# Patient Record
Sex: MALE | Race: BLACK OR AFRICAN AMERICAN | NOT HISPANIC OR LATINO | Employment: FULL TIME | URBAN - METROPOLITAN AREA
[De-identification: names, ages, dates, MRNs, and addresses within clinical notes are randomized per-mention and may not be internally consistent; named-entity substitution may affect disease eponyms.]

---

## 2017-11-01 ENCOUNTER — OFFICE VISIT (OUTPATIENT)
Dept: FAMILY MEDICINE | Facility: CLINIC | Age: 57
End: 2017-11-01
Payer: COMMERCIAL

## 2017-11-01 VITALS
DIASTOLIC BLOOD PRESSURE: 86 MMHG | WEIGHT: 211.63 LBS | TEMPERATURE: 99 F | OXYGEN SATURATION: 97 % | HEIGHT: 66 IN | BODY MASS INDEX: 34.01 KG/M2 | SYSTOLIC BLOOD PRESSURE: 110 MMHG | HEART RATE: 73 BPM

## 2017-11-01 DIAGNOSIS — E78.5 HYPERLIPIDEMIA, UNSPECIFIED HYPERLIPIDEMIA TYPE: ICD-10-CM

## 2017-11-01 DIAGNOSIS — M54.50 ACUTE LEFT-SIDED LOW BACK PAIN WITHOUT SCIATICA: ICD-10-CM

## 2017-11-01 DIAGNOSIS — I10 ESSENTIAL HYPERTENSION: Primary | ICD-10-CM

## 2017-11-01 DIAGNOSIS — E03.8 OTHER SPECIFIED HYPOTHYROIDISM: ICD-10-CM

## 2017-11-01 DIAGNOSIS — R73.03 PREDIABETES: ICD-10-CM

## 2017-11-01 DIAGNOSIS — J30.89 CHRONIC NONSEASONAL ALLERGIC RHINITIS DUE TO OTHER ALLERGEN: ICD-10-CM

## 2017-11-01 PROCEDURE — 99204 OFFICE O/P NEW MOD 45 MIN: CPT | Mod: S$GLB,,, | Performed by: FAMILY MEDICINE

## 2017-11-01 PROCEDURE — 99999 PR PBB SHADOW E&M-EST. PATIENT-LVL III: CPT | Mod: PBBFAC,,, | Performed by: FAMILY MEDICINE

## 2017-11-01 RX ORDER — LEVOTHYROXINE SODIUM 25 UG/1
25 TABLET ORAL DAILY
COMMUNITY
End: 2017-11-01 | Stop reason: SDUPTHER

## 2017-11-01 RX ORDER — LEVOTHYROXINE SODIUM 25 UG/1
25 TABLET ORAL DAILY
Qty: 90 TABLET | Refills: 1 | Status: SHIPPED | OUTPATIENT
Start: 2017-11-01 | End: 2019-01-22 | Stop reason: SDUPTHER

## 2017-11-01 RX ORDER — CYCLOBENZAPRINE HCL 10 MG
10 TABLET ORAL NIGHTLY
Qty: 21 TABLET | Refills: 0 | Status: SHIPPED | OUTPATIENT
Start: 2017-11-01 | End: 2019-01-22

## 2017-11-01 RX ORDER — AMLODIPINE BESYLATE 5 MG/1
5 TABLET ORAL DAILY
Qty: 90 TABLET | Refills: 1 | Status: SHIPPED | OUTPATIENT
Start: 2017-11-01 | End: 2019-01-10 | Stop reason: SDUPTHER

## 2017-11-01 RX ORDER — LOVASTATIN 20 MG/1
10 TABLET ORAL NIGHTLY
COMMUNITY
End: 2017-11-01 | Stop reason: SDUPTHER

## 2017-11-01 RX ORDER — LOVASTATIN 20 MG/1
10 TABLET ORAL NIGHTLY
Qty: 90 TABLET | Refills: 1 | Status: SHIPPED | OUTPATIENT
Start: 2017-11-01 | End: 2019-01-22

## 2017-11-01 RX ORDER — NAPROXEN 500 MG/1
500 TABLET ORAL 2 TIMES DAILY WITH MEALS
Qty: 30 TABLET | Refills: 0 | Status: SHIPPED | OUTPATIENT
Start: 2017-11-01 | End: 2019-01-22

## 2017-11-01 NOTE — PROGRESS NOTES
Office Visit    Patient Name: Vijay Rodriguez    : 1960  MRN: [id number]      Assessment/Plan:  Vijay Rodriguez is a 57 y.o. male who presents today for :    Essential hypertension  -     amLODIPine (NORVASC) 5 MG tablet; Take 1 tablet (5 mg total) by mouth once daily.  Dispense: 90 tablet; Refill: 1  -BP acceptable for age - continue current med regimen  -previous labs reviewed  -advised DASH diet, regular exercise    Prediabetes  -recheck A1c    Hyperlipidemia, unspecified hyperlipidemia type  -     lovastatin (MEVACOR) 20 MG tablet; Take 0.5 tablets (10 mg total) by mouth every evening.  Dispense: 90 tablet; Refill: 1    Other specified hypothyroidism  -     levothyroxine (SYNTHROID) 25 MCG tablet; Take 1 tablet (25 mcg total) by mouth once daily.  Dispense: 90 tablet; Refill: 1  -recheck TSH, cont on current med    Acute left-sided low back pain without sciatica  -     cyclobenzaprine (FLEXERIL) 10 MG tablet; Take 1 tablet (10 mg total) by mouth every evening.  Dispense: 21 tablet; Refill: 0  -     naproxen (NAPROSYN) 500 MG tablet; Take 1 tablet (500 mg total) by mouth 2 (two) times daily with meals.  Dispense: 30 tablet; Refill: 0  -exam c/w mm spasm with no new Sx or red flags.  -reassurance provided - advised pt that pain may last up to 4-6 weeks, but in the meantime, advised to add heat/massage and avoid provocative movements that could worsen pain, maintain good posture, as well as using proper lifting techniques.  -initiate Naproxen and Flexeril, advised adding Tylenol in between doses of NSAIDs as needed for pain. Side effects and precautions given.   -counseled patient extensively on stretching/strengthening exercises, maintaining good posture as demonstrated in clinic (handout also given) to help with decreasing risk for future injury.  -RTC in 4-6 weeks, or sooner if Sx worsens or call clinic back if pt has any concerns.        Chronic nonseasonal allergic rhinitis due to other  allergen  -     Ambulatory referral to Allergy      No Follow-up on file.     This note was created by combination of typed  and Dragon dictation.  Transcription errors may be present.  If there are any questions, please contact me.                  ----------------------------------------------------------------------------------------------------------------------      HPI:  Vijay KAN Jr is a 57 y.o. male who presents today for:    Hypertension and Back Pain        This patient has multiple medical diagnoses as noted below.    This patient is new to me     He is here to establish care and get refills on his medications    HTN  refills needed today  pt is compliant with daily HTN medications without any side effects  current medication: amlodipine 5mg  BP log at home? No logs  Pt denies vision changes/urinary changes/leg swelling  Pt tries restricting salt-intake, doing regular exercises.    HLD  tolerating statin well with no side effects.  Current dose: lovastatin 10mg  No mm aches or pain    Pre-diabetes   Patient states he was told he had prediabetes and was put on this medication for a few months with previous PCP and was taken off of it - has been off of it for several years.      Denies polyuria/polydipsia/foot numbness/tingling/vision changes/hypoglycemia  Denies dietary compliance/wt loss/exercise    Hypothyroidism    Tolerating medication well, no side effects.   Currently taking daily Levothyroxine 25mcg  DENIES fatigue/generalized arthralgias or myalgia/cold intolerance/tremors/wt gain/dry skin/hair loss/brittle nails      BACK PAIN    here for back pain for 3 weeks, in the left side  No inciting event  Worse with bending over, twisting trunk, going from sitting to standing position  Better with OTC meds, but pain returns  Pain level: 7/10  Pt is still able to ambulate comfortably and perform daily routine    No wt loss/fatigue/malaise/BMs changes or urinary changes/pain radiation to  "LEs/tingling/numbness/weakness.      Has a history of severe allergic rhinitis - constant sneezing/coughing and congestion.  Would like new referral to Allergist.      Additional ROS    No F/C/wt changes/fatigue  No dysphagia/sore throat  No CP/SOB/palpitations/swelling  No cough/wheezing  No nausea/vomiting/abd pain/blood in stool, no diarrhea, no constipation  No rashes  No weakness/HA/tingling/numbness  No anxiety/depression  No dysuria/hematuria      Patient Active Problem List   Diagnosis    Elbow pain    Hypertension    Diabetes    Allergic rhinitis    Stress    Arm numbness left       Past Surgical History:   Procedure Laterality Date    PROSTATE SURGERY  2007       Family History   Problem Relation Age of Onset    Cancer Mother 60     colon    Hypertension Mother     Cancer Father 60     prostate    Diabetes Father     Hypertension Father     Hypertension Brother     Cancer Brother 52     pancreatic       Social History     Social History    Marital status:      Spouse name: N/A    Number of children: N/A    Years of education: N/A     Occupational History     [employer]     Social History Main Topics    Smoking status: Current Every Day Smoker     Packs/day: 0.25     Years: 30.00    Smokeless tobacco: Not on file    Alcohol use Yes      Comment: 6 pk beer per week    Drug use: No    Sexual activity: Not on file     Other Topics Concern    Not on file     Social History Narrative    No narrative on file       Current Medications  Medications reviewed and updated.     Allergies   Review of patient's allergies indicates:  No Known Allergies          Review of Systems  See HPI      Physical Exam  /86   Pulse 73   Temp 98.6 °F (37 °C) (Oral)   Ht 5' 6" (1.676 m)   Wt 96 kg (211 lb 10.3 oz)   SpO2 97%   BMI 34.16 kg/m²     GEN: NAD, well developed, pleasant, well nourished  HEENT: NCAT, PERRLA, EOMI, sclera clear, anicteric, O/P clear, MMM with no lesions  NECK: normal, " supple with midline trachea, no LAD, no thyromegaly  LUNGS: CTAB, no w/r/r, no increased work of breathing   HEART: RRR, normal S1 and S2, no m/r/g, no edema  ABD: s/nt/nd, NABS  SKIN: normal turgor, no rashes  PSYCH: AOx3, appropriate mood and affect  MSK: warm/well perfused, normal ROM in all 4 extremities, no c/c/e.  BACK: normal alignment of spine. FROM of back.   +TTP over the L4 area over L paraspinal mm.   +pain with forward flexion and backward extension. Mild pain with lateral bending. NEG straight leg raise.

## 2017-11-04 ENCOUNTER — TELEPHONE (OUTPATIENT)
Dept: FAMILY MEDICINE | Facility: CLINIC | Age: 57
End: 2017-11-04

## 2017-11-04 RX ORDER — LOVASTATIN 10 MG/1
10 TABLET ORAL NIGHTLY
COMMUNITY
End: 2019-01-22

## 2017-11-04 NOTE — TELEPHONE ENCOUNTER
----- Message from Otilia Wise sent at 11/3/2017  3:25 PM CDT -----  Contact: Claus Alfaro [phone]  Pharmacy wants to know if the pt medication can be changed to lovastatin (MEVACOR) 20 MG tablet to lovastatin (MEVACOR) 10 MG tablet so the pt can take one by mouth every day Please call  at your earliest convenience.  Thanks!

## 2017-11-13 ENCOUNTER — LAB VISIT (OUTPATIENT)
Dept: LAB | Facility: HOSPITAL | Age: 57
End: 2017-11-13
Attending: FAMILY MEDICINE
Payer: COMMERCIAL

## 2017-11-13 DIAGNOSIS — E78.5 HYPERLIPIDEMIA, UNSPECIFIED HYPERLIPIDEMIA TYPE: ICD-10-CM

## 2017-11-13 DIAGNOSIS — I10 ESSENTIAL HYPERTENSION: ICD-10-CM

## 2017-11-13 DIAGNOSIS — E03.8 OTHER SPECIFIED HYPOTHYROIDISM: ICD-10-CM

## 2017-11-13 DIAGNOSIS — R73.03 PREDIABETES: ICD-10-CM

## 2017-11-13 LAB
ALBUMIN SERPL BCP-MCNC: 3.7 G/DL
ALP SERPL-CCNC: 67 U/L
ALT SERPL W/O P-5'-P-CCNC: 22 U/L
ANION GAP SERPL CALC-SCNC: 7 MMOL/L
AST SERPL-CCNC: 23 U/L
BILIRUB SERPL-MCNC: 0.3 MG/DL
BUN SERPL-MCNC: 9 MG/DL
CALCIUM SERPL-MCNC: 9.1 MG/DL
CHLORIDE SERPL-SCNC: 108 MMOL/L
CHOLEST SERPL-MCNC: 147 MG/DL
CHOLEST/HDLC SERPL: 4.6 {RATIO}
CO2 SERPL-SCNC: 25 MMOL/L
CREAT SERPL-MCNC: 1.2 MG/DL
ERYTHROCYTE [DISTWIDTH] IN BLOOD BY AUTOMATED COUNT: 13 %
EST. GFR  (AFRICAN AMERICAN): >60 ML/MIN/1.73 M^2
EST. GFR  (NON AFRICAN AMERICAN): >60 ML/MIN/1.73 M^2
ESTIMATED AVG GLUCOSE: 123 MG/DL
GLUCOSE SERPL-MCNC: 99 MG/DL
HBA1C MFR BLD HPLC: 5.9 %
HCT VFR BLD AUTO: 40.9 %
HDLC SERPL-MCNC: 32 MG/DL
HDLC SERPL: 21.8 %
HGB BLD-MCNC: 13.6 G/DL
LDLC SERPL CALC-MCNC: 77.2 MG/DL
MCH RBC QN AUTO: 30.2 PG
MCHC RBC AUTO-ENTMCNC: 33.3 G/DL
MCV RBC AUTO: 91 FL
NONHDLC SERPL-MCNC: 115 MG/DL
PLATELET # BLD AUTO: 319 K/UL
PMV BLD AUTO: 8.9 FL
POTASSIUM SERPL-SCNC: 4.1 MMOL/L
PROT SERPL-MCNC: 7.7 G/DL
RBC # BLD AUTO: 4.5 M/UL
SODIUM SERPL-SCNC: 140 MMOL/L
T4 FREE SERPL-MCNC: 0.82 NG/DL
TRIGL SERPL-MCNC: 189 MG/DL
TSH SERPL DL<=0.005 MIU/L-ACNC: 7.74 UIU/ML
WBC # BLD AUTO: 9.64 K/UL

## 2017-11-13 PROCEDURE — 85027 COMPLETE CBC AUTOMATED: CPT

## 2017-11-13 PROCEDURE — 36415 COLL VENOUS BLD VENIPUNCTURE: CPT | Mod: PO

## 2017-11-13 PROCEDURE — 84443 ASSAY THYROID STIM HORMONE: CPT

## 2017-11-13 PROCEDURE — 80053 COMPREHEN METABOLIC PANEL: CPT

## 2017-11-13 PROCEDURE — 83036 HEMOGLOBIN GLYCOSYLATED A1C: CPT

## 2017-11-13 PROCEDURE — 84439 ASSAY OF FREE THYROXINE: CPT

## 2017-11-13 PROCEDURE — 80061 LIPID PANEL: CPT

## 2017-11-14 ENCOUNTER — TELEPHONE (OUTPATIENT)
Dept: FAMILY MEDICINE | Facility: CLINIC | Age: 57
End: 2017-11-14

## 2017-11-14 DIAGNOSIS — E03.8 OTHER SPECIFIED HYPOTHYROIDISM: ICD-10-CM

## 2017-11-14 PROBLEM — E03.9 ACQUIRED HYPOTHYROIDISM: Status: ACTIVE | Noted: 2017-11-14

## 2017-11-14 NOTE — TELEPHONE ENCOUNTER
Note to staff: Please call patient to inform of the following lab results:    --> Based on your bloodwork,your test results show:      --that you also have very mild anemia, which I recommend eating iron rich-diet, such as lean red meat/fish/chicken and vegetables such spinach/soybeans/lentils/beans.    --NORMAL liver test  --NORMAL kidney test   --NO diabetes   -But A1c is still  in Pre-diabetic range like last year, but it's improved this year. I recommend lowering carbohydrates in diet (pasta, rice, bread, potatoes, crackers, cookies, candy, soda).   -you do not need to be on medications for this at this time and I recommend to continue with healthy diet and regular exercise as discussed at previous visit.  --CHOLESTEROL tests are normal except for Triglycerides, which went up, continue taking your cholesterol medication at this time. You may add Omega-3 Fatty acid supplements to your daily regimen.    -Your thyroid level T4 is normal, so continue taking your thyroid medication.  I would like for you to come back and see me after [place] to follow up on your thyroid levels to see if we need to make any medication adjustment.  Please get labs 3 days before your appointment so we can review them at that appointment.      If you would like to discuss results in detail, to please make a follow up appointment with me.      Thank you,    Isidro Prado MD

## 2017-11-16 ENCOUNTER — TELEPHONE (OUTPATIENT)
Dept: FAMILY MEDICINE | Facility: CLINIC | Age: 57
End: 2017-11-16

## 2017-11-16 NOTE — TELEPHONE ENCOUNTER
----- Message from Frankie River sent at 11/16/2017 11:12 AM CST -----  Contact: Self  Returned [phone].

## 2017-11-16 NOTE — TELEPHONE ENCOUNTER
----- Message from Frankie River sent at 11/15/2017  1:18 PM CST -----  Contact: Self  Pt returning call from yesterday. Pt can be reached @ [phone].

## 2018-10-31 ENCOUNTER — TELEPHONE (OUTPATIENT)
Dept: FAMILY MEDICINE | Facility: CLINIC | Age: 58
End: 2018-10-31

## 2018-10-31 NOTE — TELEPHONE ENCOUNTER
----- Message from Naz Lawler sent at 10/31/2018  9:46 AM CDT -----  Contact: Self   Pt returning call. [phone]

## 2018-10-31 NOTE — TELEPHONE ENCOUNTER
----- Message from Maricruz Garcia sent at 10/31/2018  8:46 AM CDT -----  Contact: Self/[phone]  Patient called to request an ENT appointment. Thank you.

## 2018-11-02 DIAGNOSIS — Z11.59 NEED FOR HEPATITIS C SCREENING TEST: ICD-10-CM

## 2019-01-10 DIAGNOSIS — I10 ESSENTIAL HYPERTENSION: ICD-10-CM

## 2019-01-11 RX ORDER — AMLODIPINE BESYLATE 5 MG/1
5 TABLET ORAL DAILY
Qty: 14 TABLET | Refills: 0 | Status: SHIPPED | OUTPATIENT
Start: 2019-01-11 | End: 2019-01-22 | Stop reason: SDUPTHER

## 2019-01-22 ENCOUNTER — OFFICE VISIT (OUTPATIENT)
Dept: FAMILY MEDICINE | Facility: CLINIC | Age: 59
End: 2019-01-22
Payer: COMMERCIAL

## 2019-01-22 VITALS
HEART RATE: 69 BPM | OXYGEN SATURATION: 98 % | WEIGHT: 220.56 LBS | HEIGHT: 67 IN | TEMPERATURE: 99 F | SYSTOLIC BLOOD PRESSURE: 136 MMHG | DIASTOLIC BLOOD PRESSURE: 78 MMHG | BODY MASS INDEX: 34.62 KG/M2

## 2019-01-22 DIAGNOSIS — E78.5 HYPERLIPIDEMIA, UNSPECIFIED HYPERLIPIDEMIA TYPE: ICD-10-CM

## 2019-01-22 DIAGNOSIS — I10 ESSENTIAL HYPERTENSION: ICD-10-CM

## 2019-01-22 DIAGNOSIS — Z00.00 ANNUAL PHYSICAL EXAM: Primary | ICD-10-CM

## 2019-01-22 DIAGNOSIS — F17.200 TOBACCO DEPENDENCE: ICD-10-CM

## 2019-01-22 DIAGNOSIS — Z85.46 HISTORY OF PROSTATE CANCER: ICD-10-CM

## 2019-01-22 DIAGNOSIS — E03.8 OTHER SPECIFIED HYPOTHYROIDISM: ICD-10-CM

## 2019-01-22 DIAGNOSIS — R73.03 PREDIABETES: ICD-10-CM

## 2019-01-22 DIAGNOSIS — E66.9 OBESITY (BMI 30-39.9): ICD-10-CM

## 2019-01-22 PROCEDURE — 3078F PR MOST RECENT DIASTOLIC BLOOD PRESSURE < 80 MM HG: ICD-10-PCS | Mod: CPTII,S$GLB,, | Performed by: FAMILY MEDICINE

## 2019-01-22 PROCEDURE — 3075F PR MOST RECENT SYSTOLIC BLOOD PRESS GE 130-139MM HG: ICD-10-PCS | Mod: CPTII,S$GLB,, | Performed by: FAMILY MEDICINE

## 2019-01-22 PROCEDURE — 99396 PREV VISIT EST AGE 40-64: CPT | Mod: S$GLB,,, | Performed by: FAMILY MEDICINE

## 2019-01-22 PROCEDURE — 99999 PR PBB SHADOW E&M-EST. PATIENT-LVL III: ICD-10-PCS | Mod: PBBFAC,,, | Performed by: FAMILY MEDICINE

## 2019-01-22 PROCEDURE — 3075F SYST BP GE 130 - 139MM HG: CPT | Mod: CPTII,S$GLB,, | Performed by: FAMILY MEDICINE

## 2019-01-22 PROCEDURE — 99396 PR PREVENTIVE VISIT,EST,40-64: ICD-10-PCS | Mod: S$GLB,,, | Performed by: FAMILY MEDICINE

## 2019-01-22 PROCEDURE — 99999 PR PBB SHADOW E&M-EST. PATIENT-LVL III: CPT | Mod: PBBFAC,,, | Performed by: FAMILY MEDICINE

## 2019-01-22 PROCEDURE — 3078F DIAST BP <80 MM HG: CPT | Mod: CPTII,S$GLB,, | Performed by: FAMILY MEDICINE

## 2019-01-22 RX ORDER — ERGOCALCIFEROL 1.25 MG/1
CAPSULE ORAL
COMMUNITY
Start: 2018-12-12 | End: 2022-03-17 | Stop reason: SDUPTHER

## 2019-01-22 RX ORDER — AMLODIPINE BESYLATE 5 MG/1
5 TABLET ORAL DAILY
Qty: 90 TABLET | Refills: 1 | Status: SHIPPED | OUTPATIENT
Start: 2019-01-22 | End: 2019-01-31

## 2019-01-22 RX ORDER — LEVOTHYROXINE SODIUM 25 UG/1
25 TABLET ORAL DAILY
Qty: 90 TABLET | Refills: 1 | Status: SHIPPED | OUTPATIENT
Start: 2019-01-22 | End: 2020-07-21

## 2019-01-22 RX ORDER — ATORVASTATIN CALCIUM 20 MG/1
20 TABLET, FILM COATED ORAL DAILY
Qty: 90 TABLET | Refills: 3 | Status: SHIPPED | OUTPATIENT
Start: 2019-01-22 | End: 2020-05-21 | Stop reason: SDUPTHER

## 2019-01-22 RX ORDER — EPINEPHRINE 0.3 MG/.3ML
INJECTION SUBCUTANEOUS
Refills: 1 | COMMUNITY
Start: 2018-12-12 | End: 2023-01-26 | Stop reason: SDUPTHER

## 2019-01-22 RX ORDER — AMLODIPINE BESYLATE 5 MG/1
5 TABLET ORAL DAILY
Qty: 14 TABLET | Refills: 0 | Status: CANCELLED | OUTPATIENT
Start: 2019-01-22

## 2019-01-22 NOTE — PROGRESS NOTES
Office Visit    Patient Name: Vijay Rodriguez    : 1960  MRN: [id number]      Assessment/Plan:  Vijay Rodriguez is a 58 y.o. male who presents today for :    Annual physical exam  -     Hepatitis C antibody; Future; Expected date: 2019  -anticipatory guidance provided with age appropriate preventative services discussed, healthy diet and regular physical exercise also discussed with patient  -d/w pt about the importance of portion control  -Diet and exercise planning discussed.  -Recommend 15-30 minutes of moderate intensity exercise 5 days/week.      Essential hypertension  -     CBC Without Differential; Future; Expected date: 2019  -     Comprehensive metabolic panel; Future; Expected date: 2019  Prediabetes  -     Hemoglobin A1c; Future; Expected date: 2019  Obesity (BMI 30-39.9)  Tobacco dependence  - continue current medications   - ?advised DASH diet, portion control, regular cardiovascular exercises  - ?counseled on weight loss  - ?counseled on smoking cessation    Other specified hypothyroidism  -     TSH; Future; Expected date: 2019  -recheck TSH  -restart Levothyroxine 25mcg    Hyperlipidemia, unspecified hyperlipidemia type  -     Lipid panel; Future; Expected date: 2019  -continue statin    History of prostate cancer  -     Ambulatory referral to Urology        Follow-up in about 6 months (around 2019).     This note was created by combination of typed  and Dragon dictation.  Transcription errors may be present.  If there are any questions, please contact me.        ----------------------------------------------------------------------------------------------------------------------      HPI:  Patient Care Team:  Isidro Prado MD as PCP - General (Family Medicine)    Vijay KAN Jr is a 58 y.o. male with      Patient Active Problem List   Diagnosis    Elbow pain    Hypertension    Allergic rhinitis    Stress    Arm numbness left     Other specified hypothyroidism    Obesity (BMI 30-39.9)    Hyperlipidemia     This patient is known to me and to this clinic. The patient's last visit with me was on 11/1/2017.  Patient presents today for:  Annual Exam      Patient is doing well and has no major complaints.  Pt is trying to eat a healthier diet, with small portions and less salt.   Pt does not engage in physical exercises regularly. He has not been taking his cholesterol nor thyroid medication, but has been compliant with Norvasc daily. No BP logs at home. He desires to switch Urology care to [facility], [place] [facility] [place] has been going there since, denies any urinary issues today.     Additional ROS  No F/C/wt changes/fatigue  No dysphagia/sore throat/rhinorrhea  No CP/GLEASON/palpitations/swelling  No cough/wheezing/SOB  No nausea/vomiting/abd pain/no diarrhea, no constipation, no blood in stool  No muscle aches/joint pain   No rashes  No weakness/HA/tingling/numbness  No anxiety/depression  No dysuria/hematuria  No polyuria/polydipsia/fatigue/cold or hot intolerance          Current Medications  Medications reviewed and updated.       Current Outpatient Medications:     amLODIPine (NORVASC) 5 MG tablet, Take 1 tablet (5 mg total) by mouth once daily. Short-term refill - please make a follow up visit with your doctor for more refills., Disp: 90 tablet, Rfl: 1    EPINEPHrine (EPIPEN) 0.3 mg/0.3 mL AtIn, TAKE 1 AUTO(S) AS NEEDED BY INJECTION ROUTE., Disp: , Rfl: 1    levothyroxine (SYNTHROID) 25 MCG tablet, Take 1 tablet (25 mcg total) by mouth once daily., Disp: 90 tablet, Rfl: 1    ranitidine (ZANTAC) 300 MG tablet, Take 1 tablet by mouth., Disp: , Rfl:     VITAMIN D2 50,000 unit capsule, , Disp: , Rfl:     atorvastatin (LIPITOR) 20 MG tablet, Take 1 tablet (20 mg total) by mouth once daily., Disp: 90 tablet, Rfl: 3    Past Surgical History:   Procedure Laterality Date    PROSTATE SURGERY  2007  "      Family History   Problem Relation Age of Onset    Cancer Mother 60        colon    Hypertension Mother     Cancer Father 60        prostate    Diabetes Father     Hypertension Father     Hypertension Brother     Cancer Brother 52        pancreatic       Social History     Socioeconomic History    Marital status:      Spouse name: Not on file    Number of children: Not on file    Years of education: Not on file    Highest education level: Not on file   Social Needs    Financial resource strain: Not on file    Food insecurity - worry: Not on file    Food insecurity - inability: Not on file    Transportation needs - medical: Not on file    Transportation needs - non-medical: Not on file   Occupational History     Employer: [employer]   Tobacco Use    Smoking status: Current Every Day Smoker     Packs/day: 0.25     Years: 30.00     Pack years: 7.50   Substance and Sexual Activity    Alcohol use: Yes     Comment: 6 pk beer per week    Drug use: No    Sexual activity: Not on file   Other Topics Concern    Not on file   Social History Narrative    Not on file           Allergies   Review of patient's allergies indicates:  No Known Allergies          Review of Systems  See HPI      Physical Exam  /78   Pulse 69   Temp 98.5 °F (36.9 °C) (Oral)   Ht 5' 7" (1.702 m)   Wt 100 kg (220 lb 9.1 oz)   SpO2 98%   BMI 34.55 kg/m²     GEN: NAD, well developed, pleasant, well nourished  HEENT: NCAT, PERRLA, EOMI, sclera clear, anicteric, bilateral ear exam wnl, O/P clear, MMM with no lesions  NECK: normal, supple with midline trachea, no LAD, no thyromegaly  LUNGS: CTAB, no w/r/r, no increased work of breathing   HEART: RRR, normal S1 and S2, no m/r/g, no edema  ABD: s/nt/nd, NABS  SKIN: normal turgor, no rashes  PSYCH: AOx3, appropriate mood and affect  MSK: warm/well perfused, normal ROM in all extremities, no c/c/e.      "

## 2019-01-31 ENCOUNTER — OFFICE VISIT (OUTPATIENT)
Dept: FAMILY MEDICINE | Facility: CLINIC | Age: 59
End: 2019-01-31
Payer: COMMERCIAL

## 2019-01-31 ENCOUNTER — TELEPHONE (OUTPATIENT)
Dept: FAMILY MEDICINE | Facility: CLINIC | Age: 59
End: 2019-01-31

## 2019-01-31 VITALS
WEIGHT: 220 LBS | HEIGHT: 67 IN | OXYGEN SATURATION: 96 % | SYSTOLIC BLOOD PRESSURE: 140 MMHG | DIASTOLIC BLOOD PRESSURE: 96 MMHG | BODY MASS INDEX: 34.53 KG/M2 | HEART RATE: 70 BPM | TEMPERATURE: 98 F

## 2019-01-31 DIAGNOSIS — Z23 INFLUENZA VACCINE NEEDED: ICD-10-CM

## 2019-01-31 DIAGNOSIS — I10 ESSENTIAL HYPERTENSION: Primary | ICD-10-CM

## 2019-01-31 DIAGNOSIS — E03.8 OTHER SPECIFIED HYPOTHYROIDISM: ICD-10-CM

## 2019-01-31 DIAGNOSIS — R73.03 PREDIABETES: ICD-10-CM

## 2019-01-31 DIAGNOSIS — E78.5 HYPERLIPIDEMIA, UNSPECIFIED HYPERLIPIDEMIA TYPE: ICD-10-CM

## 2019-01-31 DIAGNOSIS — F17.200 TOBACCO DEPENDENCE: ICD-10-CM

## 2019-01-31 DIAGNOSIS — Z11.59 ENCOUNTER FOR HEPATITIS C SCREENING TEST FOR LOW RISK PATIENT: ICD-10-CM

## 2019-01-31 LAB
ALBUMIN SERPL-MCNC: 4.2 G/DL (ref 3.6–5.1)
ALBUMIN/GLOB SERPL: 1.5 (CALC) (ref 1–2.5)
ALP SERPL-CCNC: 62 U/L (ref 40–115)
ALT SERPL-CCNC: 22 U/L (ref 9–46)
AST SERPL-CCNC: 25 U/L (ref 10–35)
BILIRUB SERPL-MCNC: 0.4 MG/DL (ref 0.2–1.2)
BUN SERPL-MCNC: 12 MG/DL (ref 7–25)
BUN/CREAT SERPL: ABNORMAL (CALC) (ref 6–22)
CALCIUM SERPL-MCNC: 9.5 MG/DL (ref 8.6–10.3)
CHLORIDE SERPL-SCNC: 104 MMOL/L (ref 98–110)
CHOLEST SERPL-MCNC: 99 MG/DL
CHOLEST/HDLC SERPL: 4.1 (CALC)
CO2 SERPL-SCNC: 27 MMOL/L (ref 20–32)
CREAT SERPL-MCNC: 1.22 MG/DL (ref 0.7–1.33)
ERYTHROCYTE [DISTWIDTH] IN BLOOD BY AUTOMATED COUNT: 12.6 % (ref 11–15)
EST. GFR  (NON AFRICAN AMERICAN): 65 ML/MIN/1.73M2
GLOBULIN SER CALC-MCNC: 2.8 G/DL (CALC) (ref 1.9–3.7)
GLUCOSE SERPL-MCNC: 106 MG/DL (ref 65–99)
HBA1C MFR BLD: 6.6 % OF TOTAL HGB
HCT VFR BLD AUTO: 42.4 % (ref 38.5–50)
HDLC SERPL-MCNC: 24 MG/DL
HGB BLD-MCNC: 14.3 G/DL (ref 13.2–17.1)
LDLC SERPL CALC-MCNC: 52 MG/DL (CALC)
MCH RBC QN AUTO: 29.9 PG (ref 27–33)
MCHC RBC AUTO-ENTMCNC: 33.7 G/DL (ref 32–36)
MCV RBC AUTO: 88.5 FL (ref 80–100)
NONHDLC SERPL-MCNC: 75 MG/DL (CALC)
PLATELET # BLD AUTO: 369 THOUSAND/UL (ref 140–400)
PMV BLD REES-ECKER: 9.1 FL (ref 7.5–12.5)
POTASSIUM SERPL-SCNC: 4.7 MMOL/L (ref 3.5–5.3)
PROT SERPL-MCNC: 7 G/DL (ref 6.1–8.1)
RBC # BLD AUTO: 4.79 MILLION/UL (ref 4.2–5.8)
SODIUM SERPL-SCNC: 138 MMOL/L (ref 135–146)
TRIGL SERPL-MCNC: 143 MG/DL
TSH SERPL-ACNC: 4.56 MIU/L (ref 0.4–4.5)
WBC # BLD AUTO: 9 THOUSAND/UL (ref 3.8–10.8)

## 2019-01-31 PROCEDURE — 3008F BODY MASS INDEX DOCD: CPT | Mod: CPTII,S$GLB,, | Performed by: FAMILY MEDICINE

## 2019-01-31 PROCEDURE — 3080F PR MOST RECENT DIASTOLIC BLOOD PRESSURE >= 90 MM HG: ICD-10-PCS | Mod: CPTII,S$GLB,, | Performed by: FAMILY MEDICINE

## 2019-01-31 PROCEDURE — 90471 FLU VACCINE (QUAD) GREATER THAN OR EQUAL TO 3YO PRESERVATIVE FREE IM: ICD-10-PCS | Mod: S$GLB,,, | Performed by: FAMILY MEDICINE

## 2019-01-31 PROCEDURE — 99214 OFFICE O/P EST MOD 30 MIN: CPT | Mod: 25,S$GLB,, | Performed by: FAMILY MEDICINE

## 2019-01-31 PROCEDURE — 99999 PR PBB SHADOW E&M-EST. PATIENT-LVL III: CPT | Mod: PBBFAC,,, | Performed by: FAMILY MEDICINE

## 2019-01-31 PROCEDURE — 90471 IMMUNIZATION ADMIN: CPT | Mod: S$GLB,,, | Performed by: FAMILY MEDICINE

## 2019-01-31 PROCEDURE — 3008F PR BODY MASS INDEX (BMI) DOCUMENTED: ICD-10-PCS | Mod: CPTII,S$GLB,, | Performed by: FAMILY MEDICINE

## 2019-01-31 PROCEDURE — 90686 IIV4 VACC NO PRSV 0.5 ML IM: CPT | Mod: S$GLB,,, | Performed by: FAMILY MEDICINE

## 2019-01-31 PROCEDURE — 99999 PR PBB SHADOW E&M-EST. PATIENT-LVL III: ICD-10-PCS | Mod: PBBFAC,,, | Performed by: FAMILY MEDICINE

## 2019-01-31 PROCEDURE — 3080F DIAST BP >= 90 MM HG: CPT | Mod: CPTII,S$GLB,, | Performed by: FAMILY MEDICINE

## 2019-01-31 PROCEDURE — 3077F PR MOST RECENT SYSTOLIC BLOOD PRESSURE >= 140 MM HG: ICD-10-PCS | Mod: CPTII,S$GLB,, | Performed by: FAMILY MEDICINE

## 2019-01-31 PROCEDURE — 99214 PR OFFICE/OUTPT VISIT, EST, LEVL IV, 30-39 MIN: ICD-10-PCS | Mod: 25,S$GLB,, | Performed by: FAMILY MEDICINE

## 2019-01-31 PROCEDURE — 3077F SYST BP >= 140 MM HG: CPT | Mod: CPTII,S$GLB,, | Performed by: FAMILY MEDICINE

## 2019-01-31 PROCEDURE — 90686 FLU VACCINE (QUAD) GREATER THAN OR EQUAL TO 3YO PRESERVATIVE FREE IM: ICD-10-PCS | Mod: S$GLB,,, | Performed by: FAMILY MEDICINE

## 2019-01-31 RX ORDER — IBUPROFEN 200 MG
1 TABLET ORAL DAILY
Qty: 21 PATCH | Refills: 15 | Status: SHIPPED | OUTPATIENT
Start: 2019-01-31 | End: 2022-03-17 | Stop reason: SDUPTHER

## 2019-01-31 RX ORDER — AMLODIPINE BESYLATE 10 MG/1
10 TABLET ORAL DAILY
Qty: 90 TABLET | Refills: 0 | Status: SHIPPED | OUTPATIENT
Start: 2019-01-31 | End: 2019-05-01 | Stop reason: SDUPTHER

## 2019-01-31 NOTE — PROGRESS NOTES
Office Visit    Patient Name: Vijay Rodriguez Jr.    : 1960  MRN: [id number]      Assessment/Plan:  Vijay Rodriguez Jr. is a 58 y.o. male who presents today for :    Essential hypertension  -     amLODIPine (NORVASC) 10 MG tablet; Take 1 tablet (10 mg total) by mouth once daily. Please make a follow up visit with your doctor when this prescription is about to run out.  Dispense: 90 tablet; Refill: 0  -labs reviewed with patient today, BP still elevated on current regimen, increase Norvasc dose as above.  -advised medication compliance, and avoid regular use of NSAIDs as it can increase BP.  -advised DASH diet, regular exercise, and weight loss  - as well as portion control.   -advised to keep regular BP logs and bring it back with each f/u visit.  -f/u with BP logs in 2 weeks if BP is not consistently <140/90    Hyperlipidemia, unspecified hyperlipidemia type  -cont statin    Prediabetes  -     Hemoglobin A1c; Future; Expected date: 2019  -     Microalbumin/creatinine urine ratio; Future; Expected date: 2019  -pt desires to be on TLC, does not want to start on Metformin at this time    Influenza vaccine needed  -     Influenza - Quadrivalent (3 years & older) (PF)    Encounter for hepatitis C screening test for low risk patient  -     Hepatitis C antibody; Future; Expected date: 2019    Tobacco dependence  -     nicotine (NICODERM CQ) 14 mg/24 hr; Place 1 patch onto the skin once daily.  Dispense: 21 patch; Refill: 15    Other specified hypothyroidism  -stable, continue current med      Follow-up in about 3 months (around 2019).     This note was created by combination of typed  and Dragon dictation.  Transcription errors may be present.  If there are any questions, please contact me.      ----------------------------------------------------------------------------------------------------------------------      HPI:  Patient Care Team:  Isidro Prado MD as PCP - General  (Family Medicine)    Vijay is a 58 y.o. male with      Patient Active Problem List   Diagnosis    Elbow pain    Hypertension    Allergic rhinitis    Stress    Arm numbness left    Other specified hypothyroidism    Obesity (BMI 30-39.9)    Hyperlipidemia    Prediabetes    Tobacco dependence     This patient is known to me and to this clinic. The patient's last visit with me was on 1/22/2019.    Patient presents today for f/u :  Results      HTN - currently on amlodipine 5mg, doesn't check Bp at home. Denies any vision changes/urinary changes/leg swelling. HLD - tolerating statin well without any issues  His A1c has increased and patient is borderline diabetic, which I discussed options regarding medication treatment and changing eating/exercise habits. Pt also desires to be placed [place] patch after he discussed with his smoking cessation counselor.        Additional ROS    No F/C/wt changes/fatigue  No dysphagia/sore throat  No CP/GLEASON/palpitations/swelling  No cough/wheezing/SOB  No nausea/vomiting/abd pain/no diarrhea, no constipation, no blood in stool  No muscle aches/joint pain  No rashes  No weakness/HA/tingling/numbness  No anxiety/depression  No dysuria/hematuria      Patient Active Problem List   Diagnosis    Elbow pain    Hypertension    Allergic rhinitis    Stress    Arm numbness left    Other specified hypothyroidism    Obesity (BMI 30-39.9)    Hyperlipidemia    Prediabetes    Tobacco dependence       Current Medications  Medications reviewed/updated.     Current Outpatient Medications on File Prior to Visit   Medication Sig Dispense Refill    atorvastatin (LIPITOR) 20 MG tablet Take 1 tablet (20 mg total) by mouth once daily. 90 tablet 3    EPINEPHrine (EPIPEN) 0.3 mg/0.3 mL AtIn TAKE 1 AUTO(S) AS NEEDED BY INJECTION ROUTE.  1    levothyroxine (SYNTHROID) 25 MCG tablet Take 1 tablet (25 mcg total) by mouth once daily. 90 tablet 1    ranitidine (ZANTAC) 300 MG tablet Take 1  "tablet by mouth.      VITAMIN D2 50,000 unit capsule       [DISCONTINUED] amLODIPine (NORVASC) 5 MG tablet Take 1 tablet (5 mg total) by mouth once daily. Short-term refill - please make a follow up visit with your doctor for more refills. 90 tablet 1     No current facility-administered medications on file prior to visit.            Past Surgical History:   Procedure Laterality Date    PROSTATE SURGERY  2007       Family History   Problem Relation Age of Onset    Cancer Mother 60        colon    Hypertension Mother     Cancer Father 60        prostate    Diabetes Father     Hypertension Father     Hypertension Brother     Cancer Brother 52        pancreatic       Social History     Socioeconomic History    Marital status:      Spouse name: Not on file    Number of children: Not on file    Years of education: Not on file    Highest education level: Not on file   Social Needs    Financial resource strain: Not on file    Food insecurity - worry: Not on file    Food insecurity - inability: Not on file    Transportation needs - medical: Not on file    Transportation needs - non-medical: Not on file   Occupational History     Employer: [employer]   Tobacco Use    Smoking status: Current Every Day Smoker     Packs/day: 0.25     Years: 30.00     Pack years: 7.50   Substance and Sexual Activity    Alcohol use: Yes     Comment: 6 pk beer per week    Drug use: No    Sexual activity: Not on file   Other Topics Concern    Not on file   Social History Narrative    Not on file             Allergies   Review of patient's allergies indicates:   Allergen Reactions    Insects extract     Shellfish containing products              Review of Systems  See HPI      Physical Exam  BP (!) 140/96   Pulse 70   Temp 98 °F (36.7 °C) (Oral)   Ht 5' 7" (1.702 m)   Wt 99.8 kg (220 lb)   SpO2 96%   BMI 34.46 kg/m²     GEN: NAD, well developed, pleasant, well nourished  HEENT: NCAT, PERRLA, EOMI, sclera clear, " anicteric, O/P clear, MMM with no lesions  NECK: normal, supple with midline trachea, no LAD, no thyromegaly  LUNGS: CTAB, no w/r/r, no increased work of breathing   HEART: RRR, normal S1 and S2, no m/r/g, no edema  ABD: s/nt/nd, NABS  SKIN: normal turgor, no rashes  PSYCH: AOx3, appropriate mood and affect  MSK: warm/well perfused, normal ROM in all extremities, no c/c/e.  FOOT:  Protective Sensation (w/ 10 gram monofilament):  Right: Intact  Left: Intact    Visual Inspection:  Normal -  Bilateral    Pedal Pulses:   Right: Present  Left: Present    Posterior tibialis:   Right:Present  Left: Present            Labs  Lab Results   Component Value Date    HGBA1C 6.6 (H) 01/30/2019     Lab Results   Component Value Date     01/30/2019    K 4.7 01/30/2019     01/30/2019    CO2 27 01/30/2019    BUN 12 01/30/2019    CREATININE 1.22 01/30/2019    CALCIUM 9.5 01/30/2019    ANIONGAP 7 (L) 11/13/2017    ESTGFRAFRICA 76 01/30/2019    EGFRNONAA 65 01/30/2019     Lab Results   Component Value Date    CHOL 99 01/30/2019    CHOL 147 11/13/2017    CHOL 160 11/14/2013     Lab Results   Component Value Date    HDL 24 (L) 01/30/2019    HDL 32 (L) 11/13/2017    HDL 33 (L) 11/14/2013     Lab Results   Component Value Date    LDLCALC 52 01/30/2019    LDLCALC 77.2 11/13/2017    LDLCALC 108.2 11/14/2013     Lab Results   Component Value Date    TRIG 143 01/30/2019    TRIG 189 (H) 11/13/2017    TRIG 94 11/14/2013     Lab Results   Component Value Date    CHOLHDL 4.1 01/30/2019    CHOLHDL 21.8 11/13/2017    CHOLHDL 20.6 11/14/2013     Last set of blood work has been reviewed as noted above.

## 2019-03-18 ENCOUNTER — TELEPHONE (OUTPATIENT)
Dept: FAMILY MEDICINE | Facility: CLINIC | Age: 59
End: 2019-03-18

## 2019-03-18 NOTE — TELEPHONE ENCOUNTER
----- Message from Maryan Mane sent at 3/18/2019  9:31 AM CDT -----  Contact: Self   Type: RX Refill Request    Who Called: Self     Refill or New Rx: New     RX Name and Strength: Chantix    How is the patient currently taking it? (ex. 1XDay):doesnt know     Is this a 30 day or 90 day RX: 90    Preferred Pharmacy with phone number:   [facility]/pharmacy #[id number] - [place], [place] - [address] [phone] (Phone)  [phone] (Fax)        Local or Mail Order:Local  Ordering Provider: Isidro Herrera Call Back Number: [phone]    Additional Information: pt was advised from smoking cessation to ask for Chantix

## 2019-03-23 ENCOUNTER — TELEPHONE (OUTPATIENT)
Dept: FAMILY MEDICINE | Facility: CLINIC | Age: 59
End: 2019-03-23

## 2019-03-23 NOTE — TELEPHONE ENCOUNTER
----- Message from Sabrinastacy Vincent sent at 3/18/2019 11:28 AM CDT -----  Contact: SHUBHAM SAUCEDO JR. [[id number]]  Type:  Patient Returning Call    Who Called: SHUBHAM SAUCEDO JR. [[id number]]    Who Left Message for Patient: Rakel Dickinson MA     Does the patient know what this is regarding?    Best Call Back Number:[phone]    Additional Information: Questions regarding Chantix

## 2019-05-01 DIAGNOSIS — I10 ESSENTIAL HYPERTENSION: ICD-10-CM

## 2019-05-02 RX ORDER — AMLODIPINE BESYLATE 10 MG/1
10 TABLET ORAL DAILY
Qty: 90 TABLET | Refills: 0 | Status: SHIPPED | OUTPATIENT
Start: 2019-05-02 | End: 2020-05-21 | Stop reason: SDUPTHER

## 2020-02-14 DIAGNOSIS — I10 HYPERTENSION: ICD-10-CM

## 2020-02-21 DIAGNOSIS — Z11.59 NEED FOR HEPATITIS C SCREENING TEST: ICD-10-CM

## 2020-05-21 ENCOUNTER — OFFICE VISIT (OUTPATIENT)
Dept: FAMILY MEDICINE | Facility: CLINIC | Age: 60
End: 2020-05-21
Payer: COMMERCIAL

## 2020-05-21 VITALS
OXYGEN SATURATION: 96 % | HEIGHT: 67 IN | HEART RATE: 70 BPM | DIASTOLIC BLOOD PRESSURE: 79 MMHG | WEIGHT: 217.5 LBS | SYSTOLIC BLOOD PRESSURE: 130 MMHG | BODY MASS INDEX: 34.14 KG/M2 | TEMPERATURE: 98 F

## 2020-05-21 DIAGNOSIS — E11.9 CONTROLLED TYPE 2 DIABETES MELLITUS WITHOUT COMPLICATION, WITHOUT LONG-TERM CURRENT USE OF INSULIN: ICD-10-CM

## 2020-05-21 DIAGNOSIS — M25.562 ACUTE PAIN OF LEFT KNEE: ICD-10-CM

## 2020-05-21 DIAGNOSIS — E66.9 OBESITY (BMI 30-39.9): ICD-10-CM

## 2020-05-21 DIAGNOSIS — Z23 ENCOUNTER FOR ADMINISTRATION OF VACCINE: ICD-10-CM

## 2020-05-21 DIAGNOSIS — I10 ESSENTIAL HYPERTENSION: ICD-10-CM

## 2020-05-21 DIAGNOSIS — Z00.00 ANNUAL PHYSICAL EXAM: Primary | ICD-10-CM

## 2020-05-21 DIAGNOSIS — E78.5 HYPERLIPIDEMIA, UNSPECIFIED HYPERLIPIDEMIA TYPE: ICD-10-CM

## 2020-05-21 DIAGNOSIS — E03.8 OTHER SPECIFIED HYPOTHYROIDISM: ICD-10-CM

## 2020-05-21 DIAGNOSIS — Z72.0 TOBACCO USE: ICD-10-CM

## 2020-05-21 DIAGNOSIS — R09.82 POST-NASAL DRIP: ICD-10-CM

## 2020-05-21 DIAGNOSIS — J32.0 CHRONIC MAXILLARY SINUSITIS: ICD-10-CM

## 2020-05-21 PROCEDURE — 90732 PNEUMOCOCCAL POLYSACCHARIDE VACCINE 23-VALENT =>2YO SQ IM: ICD-10-PCS | Mod: S$GLB,,, | Performed by: FAMILY MEDICINE

## 2020-05-21 PROCEDURE — 99214 PR OFFICE/OUTPT VISIT, EST, LEVL IV, 30-39 MIN: ICD-10-PCS | Mod: 25,S$GLB,, | Performed by: FAMILY MEDICINE

## 2020-05-21 PROCEDURE — 99396 PR PREVENTIVE VISIT,EST,40-64: ICD-10-PCS | Mod: 25,S$GLB,, | Performed by: FAMILY MEDICINE

## 2020-05-21 PROCEDURE — 99999 PR PBB SHADOW E&M-EST. PATIENT-LVL III: CPT | Mod: PBBFAC,,, | Performed by: FAMILY MEDICINE

## 2020-05-21 PROCEDURE — 99999 PR PBB SHADOW E&M-EST. PATIENT-LVL III: ICD-10-PCS | Mod: PBBFAC,,, | Performed by: FAMILY MEDICINE

## 2020-05-21 PROCEDURE — 90732 PPSV23 VACC 2 YRS+ SUBQ/IM: CPT | Mod: S$GLB,,, | Performed by: FAMILY MEDICINE

## 2020-05-21 PROCEDURE — 99396 PREV VISIT EST AGE 40-64: CPT | Mod: 25,S$GLB,, | Performed by: FAMILY MEDICINE

## 2020-05-21 PROCEDURE — 90471 PNEUMOCOCCAL POLYSACCHARIDE VACCINE 23-VALENT =>2YO SQ IM: ICD-10-PCS | Mod: S$GLB,,, | Performed by: FAMILY MEDICINE

## 2020-05-21 PROCEDURE — 90471 IMMUNIZATION ADMIN: CPT | Mod: S$GLB,,, | Performed by: FAMILY MEDICINE

## 2020-05-21 PROCEDURE — 3008F BODY MASS INDEX DOCD: CPT | Mod: CPTII,S$GLB,, | Performed by: FAMILY MEDICINE

## 2020-05-21 PROCEDURE — 3078F DIAST BP <80 MM HG: CPT | Mod: CPTII,S$GLB,, | Performed by: FAMILY MEDICINE

## 2020-05-21 PROCEDURE — 99214 OFFICE O/P EST MOD 30 MIN: CPT | Mod: 25,S$GLB,, | Performed by: FAMILY MEDICINE

## 2020-05-21 PROCEDURE — 3008F PR BODY MASS INDEX (BMI) DOCUMENTED: ICD-10-PCS | Mod: CPTII,S$GLB,, | Performed by: FAMILY MEDICINE

## 2020-05-21 PROCEDURE — 3075F PR MOST RECENT SYSTOLIC BLOOD PRESS GE 130-139MM HG: ICD-10-PCS | Mod: CPTII,S$GLB,, | Performed by: FAMILY MEDICINE

## 2020-05-21 PROCEDURE — 3078F PR MOST RECENT DIASTOLIC BLOOD PRESSURE < 80 MM HG: ICD-10-PCS | Mod: CPTII,S$GLB,, | Performed by: FAMILY MEDICINE

## 2020-05-21 PROCEDURE — 3075F SYST BP GE 130 - 139MM HG: CPT | Mod: CPTII,S$GLB,, | Performed by: FAMILY MEDICINE

## 2020-05-21 RX ORDER — TADALAFIL 20 MG/1
20 TABLET ORAL
COMMUNITY
Start: 2019-08-23 | End: 2021-05-03 | Stop reason: SDUPTHER

## 2020-05-21 RX ORDER — ALBUTEROL SULFATE 90 UG/1
2 AEROSOL, METERED RESPIRATORY (INHALATION)
COMMUNITY
Start: 2019-10-28

## 2020-05-21 RX ORDER — CELECOXIB 200 MG/1
200 CAPSULE ORAL
COMMUNITY
End: 2021-03-08

## 2020-05-21 RX ORDER — METFORMIN HYDROCHLORIDE 500 MG/1
1000 TABLET, EXTENDED RELEASE ORAL DAILY
Qty: 90 TABLET | Refills: 1 | Status: SHIPPED | OUTPATIENT
Start: 2020-05-21 | End: 2021-03-08 | Stop reason: SDUPTHER

## 2020-05-21 RX ORDER — METFORMIN HYDROCHLORIDE 500 MG/1
1000 TABLET, EXTENDED RELEASE ORAL
COMMUNITY
Start: 2019-08-23 | End: 2020-05-21 | Stop reason: SDUPTHER

## 2020-05-21 RX ORDER — LOVASTATIN 10 MG/1
10 TABLET ORAL
COMMUNITY
Start: 2019-08-23 | End: 2020-05-21

## 2020-05-21 RX ORDER — AZELASTINE 1 MG/ML
2 SPRAY, METERED NASAL
COMMUNITY
Start: 2019-09-17 | End: 2020-06-23

## 2020-05-21 RX ORDER — ATORVASTATIN CALCIUM 20 MG/1
20 TABLET, FILM COATED ORAL DAILY
Qty: 90 TABLET | Refills: 3 | Status: SHIPPED | OUTPATIENT
Start: 2020-05-21 | End: 2021-06-26 | Stop reason: SDUPTHER

## 2020-05-21 RX ORDER — BENZONATATE 200 MG/1
200 CAPSULE ORAL
COMMUNITY
Start: 2019-10-28 | End: 2021-03-08

## 2020-05-21 RX ORDER — AMLODIPINE BESYLATE 10 MG/1
10 TABLET ORAL DAILY
Qty: 90 TABLET | Refills: 1 | Status: SHIPPED | OUTPATIENT
Start: 2020-05-21 | End: 2021-03-08 | Stop reason: SDUPTHER

## 2020-05-21 RX ORDER — AMOXICILLIN 875 MG/1
875 TABLET, FILM COATED ORAL EVERY 12 HOURS
Qty: 20 TABLET | Refills: 0 | Status: SHIPPED | OUTPATIENT
Start: 2020-05-21 | End: 2021-03-08

## 2020-05-21 RX ORDER — NAPROXEN 500 MG/1
500 TABLET ORAL 2 TIMES DAILY WITH MEALS
Qty: 30 TABLET | Refills: 0 | Status: SHIPPED | OUTPATIENT
Start: 2020-05-21 | End: 2020-06-30

## 2020-05-21 RX ORDER — FLUTICASONE PROPIONATE 50 MCG
SPRAY, SUSPENSION (ML) NASAL
COMMUNITY
Start: 2019-09-17 | End: 2020-06-23

## 2020-05-21 RX ORDER — CYCLOBENZAPRINE HCL 10 MG
10 TABLET ORAL
COMMUNITY
End: 2022-03-17

## 2020-05-21 NOTE — PROGRESS NOTES
Office Visit    Patient Name: Vijay Rodriguez Jr.    : 1960  MRN: [id number]      Assessment/Plan:  Vijay Rodriguez Jr. is a 59 y.o. male who presents today for :    Annual physical exam  -     Hemoglobin A1C; Future; Expected date: 2020  -     CBC Without Differential; Future; Expected date: 2020  -     Comprehensive metabolic panel; Future; Expected date: 2020  -     Lipid Panel; Future; Expected date: 2020  -     Hepatitis C Antibody; Future; Expected date: 2020  -     TSH; Future; Expected date: 2020    Obesity (BMI 30-39.9)    Encounter for administration of vaccine  -     Pneumococcal Polysaccharide Vaccine (23 Valent) (SQ/IM)      -anticipatory guidance provided with age appropriate preventative services discussed, healthy diet and regular physical exercise also discussed with patient  -any additional health maintenance will be readdressed at the next physical if declined or deferred by the patient today   -d/w pt about the importance of portion control  -Recommend 15-30 minutes of moderate intensity exercise 5 days/week.      Follow up   6mo          Additional Evaluation & Management issues:     In addition to today's Annual Physical, patient has other medical issues that need to be addressed, as well as their associated prescription management that is separate from today's Physical  - as documented separately below the Annual Physical portion of this encounter.        This note was created by combination of typed  and MModal dictation.  Transcription errors may be present.  If there are any questions, please contact me.        ----------------------------------------------------------------------------------------------------------------------      HPI:  Patient Care Team:  Isidro Prado MD as PCP - General (Family Medicine)  Francia Solorio MA as Care Coordinator    Vijay is a 59 y.o. male with      Patient Active Problem List   Diagnosis     Elbow pain    Hypertension    Allergic rhinitis    Stress    Arm numbness left    Other specified hypothyroidism    Obesity (BMI 30-39.9)    Hyperlipidemia    Prediabetes    Tobacco dependence    Tobacco use    Controlled type 2 diabetes mellitus without complication, without long-term current use of insulin    Chronic maxillary sinusitis         Patient presents today for:  Sinus Problem and Knee Pain    In addition to addressing the reasons for this office visit as above, which is further discussed and addressed in the separate E&M section of this note, patient also due for annual bloodwork today. Health maintenance-wise, he is up to date on colon cancer screening. Patient reports that BP well controlled at home, compliant with current medication regimen daily without any adverse side effects. He doesn't check his FBG.  We reviewed healthy diet measures to maintain a healthy weight. He does not engage in physical exercises regularly,   due to a busy work schedule, which I encourage patient to incorporate into daily routine. He denies any cardiovascular or neurologic complaints today        Additional ROS  No F/C/wt changes/fatigue  No dysphagia, sore throat, cough, +rhinorrhea/nasal congestion  No CP/GLEAOSN/palpitations/swelling  No wheezing/SOB  No nausea/vomiting/abd pain/no diarrhea, no constipation, no blood in stool  No muscle aches, +left knee pain  No rashes  No MSK weakness/HA/tingling/numbness  No anxiety/depression  No dysuria/hematuria  No polyuria/polydipsia/cold or hot intolerance          Current Medications  Medications reviewed and updated.       Current Outpatient Medications:     albuterol (PROVENTIL/VENTOLIN HFA) 90 mcg/actuation inhaler, Inhale 2 puffs into the lungs., Disp: , Rfl:     amLODIPine (NORVASC) 10 MG tablet, Take 1 tablet (10 mg total) by mouth once daily. Followup Dr.T Prado NOV 2020 for refills, get labs 2-3 days before (ordered) (may call to schedule a Video or  Telephone Virtual visit), Disp: 90 tablet, Rfl: 1    azelastine (ASTELIN) 137 mcg (0.1 %) nasal spray, 2 sprays by Nasal route., Disp: , Rfl:     benzonatate (TESSALON) 200 MG capsule, Take 200 mg by mouth., Disp: , Rfl:     celecoxib (CELEBREX) 200 MG capsule, Take 200 mg by mouth., Disp: , Rfl:     cyclobenzaprine (FLEXERIL) 10 MG tablet, Take 10 mg by mouth., Disp: , Rfl:     EPINEPHrine (EPIPEN) 0.3 mg/0.3 mL AtIn, TAKE 1 AUTO(S) AS NEEDED BY INJECTION ROUTE., Disp: , Rfl: 1    fluticasone propionate (FLONASE) 50 mcg/actuation nasal spray, SPRAY 2 SPRAYS INTO EACH NOSTRIL EVERY DAY, Disp: , Rfl:     levothyroxine (SYNTHROID) 25 MCG tablet, Take 1 tablet (25 mcg total) by mouth once daily., Disp: 90 tablet, Rfl: 1    metFORMIN (GLUCOPHAGE-XR) 500 MG XR 24hr tablet, Take 2 tablets (1,000 mg total) by mouth once daily. Followup Dr.T Prado NOV 2020 for refills, get labs 2-3 days before (ordered) (may call to schedule a Video or Telephone Virtual visit), Disp: 90 tablet, Rfl: 1    nicotine (NICODERM CQ) 14 mg/24 hr, Place 1 patch onto the skin once daily., Disp: 21 patch, Rfl: 15    ranitidine (ZANTAC) 300 MG tablet, Take 1 tablet by mouth., Disp: , Rfl:     tadalafiL (CIALIS) 20 MG Tab, Take 20 mg by mouth., Disp: , Rfl:     VITAMIN D2 50,000 unit capsule, , Disp: , Rfl:     amoxicillin (AMOXIL) 875 MG tablet, Take 1 tablet (875 mg total) by mouth every 12 (twelve) hours., Disp: 20 tablet, Rfl: 0    atorvastatin (LIPITOR) 20 MG tablet, Take 1 tablet (20 mg total) by mouth once daily., Disp: 90 tablet, Rfl: 3    naproxen (NAPROSYN) 500 MG tablet, Take 1 tablet (500 mg total) by mouth 2 (two) times daily with meals., Disp: 30 tablet, Rfl: 0    Past Surgical History:   Procedure Laterality Date    PROSTATE SURGERY  2007       Family History   Problem Relation Age of Onset    Cancer Mother 60        colon    Hypertension Mother     Cancer Father 60        prostate    Diabetes Father      "Hypertension Father     Hypertension Brother     Cancer Brother 52        pancreatic       Social History     Socioeconomic History    Marital status:      Spouse name: Not on file    Number of children: Not on file    Years of education: Not on file    Highest education level: Not on file   Occupational History     Employer: [email or web address]   Social Needs    Financial resource strain: Not on file    Food insecurity:     Worry: Not on file     Inability: Not on file    Transportation needs:     Medical: Not on file     Non-medical: Not on file   Tobacco Use    Smoking status: Current Every Day Smoker     Packs/day: 0.25     Years: 30.00     Pack years: 7.50   Substance and Sexual Activity    Alcohol use: Yes     Comment: 6 pk beer per week    Drug use: No    Sexual activity: Not on file   Lifestyle    Physical activity:     Days per week: Not on file     Minutes per session: Not on file    Stress: Not on file   Relationships    Social connections:     Talks on phone: Not on file     Gets together: Not on file     Attends Rastafarian service: Not on file     Active member of club or organization: Not on file     Attends meetings of clubs or organizations: Not on file     Relationship status: Not on file   Other Topics Concern    Not on file   Social History Narrative    Not on file           Allergies   Review of patient's allergies indicates:   Allergen Reactions    Hymenoptera allergenic extract Anaphylaxis    Insects extract     Lisinopril Other (See Comments)     cough    Shellfish containing products Hives             Review of Systems  See HPI      Physical Exam  /79   Pulse 70   Temp 98.4 °F (36.9 °C)   Ht 5' 7" (1.702 m)   Wt 98.6 kg (217 lb 7.7 oz)   SpO2 96%   BMI 34.06 kg/m²       GEN: NAD, well developed, pleasant, well nourished  HEENT: NCAT, PERRLA, EOMI, sclera clear, anicteric, TM clear bilaterally with normal light reflex, mild nasal turbinate swelling, MMM with no " lesions, O/P clear, +minimal clear nasal discharge,  +moderate frontal/maxillary TTP. No trismus/uvula deviation  NECK: normal, supple with midline trachea, no LAD, no thyromegaly  LUNGS: CTAB, no w/r/r, no increased work of breathing   HEART: RRR, normal S1 and S2, no m/r/g, no edema  ABD: s/nt/nd, NABS  SKIN: normal turgor, no rashes  PSYCH: AOx3, appropriate mood and affect  MSK: warm/well perfused, normal ROM in all extremities, no c/c/e.  NEURO: normal without focal findings, CN II-XII are grossly intact.  Sensation/strength grossly normal, gait and station normal.         Labs  Lab Results   Component Value Date    HGBA1C 6.6 (H) 01/30/2019     Lab Results   Component Value Date     01/30/2019    K 4.7 01/30/2019     01/30/2019    CO2 27 01/30/2019    BUN 12 01/30/2019    CREATININE 1.22 01/30/2019    CALCIUM 9.5 01/30/2019    ANIONGAP 7 (L) 11/13/2017    ESTGFRAFRICA 76 01/30/2019    EGFRNONAA 65 01/30/2019     Lab Results   Component Value Date    CHOL 99 01/30/2019    CHOL 147 11/13/2017    CHOL 160 11/14/2013     Lab Results   Component Value Date    HDL 24 (L) 01/30/2019    HDL 32 (L) 11/13/2017    HDL 33 (L) 11/14/2013     Lab Results   Component Value Date    LDLCALC 52 01/30/2019    LDLCALC 77.2 11/13/2017    LDLCALC 108.2 11/14/2013     Lab Results   Component Value Date    TRIG 143 01/30/2019    TRIG 189 (H) 11/13/2017    TRIG 94 11/14/2013     Lab Results   Component Value Date    CHOLHDL 4.1 01/30/2019    CHOLHDL 21.8 11/13/2017    CHOLHDL 20.6 11/14/2013     Last set of blood work has been reviewed as noted above.          __________________________________________________________________________________________________________________________________      Additional Evaluation & Management issues:     In addition to today's Annual Physical, patient has other medical issues that need to be addressed, as well as their associated prescription management that is separate from today's  Physical  - as documented separately below      HPI:    Patient presents today for:  Sinus Problem and Knee Pain      Sinus congestion/pressure - ongoing for many years, but the past 5 days has been worse, with increased sinus pressure radiating to his forehead. He tries to take OTC meds with nasal spray and allergy medications but nothing seems to work. He desires to get an ENT evaluation as this is a chronic issue for him. +facial pressure and pain. No sick contact with similar Sx. He denies any fever/chills/N/V/sore throat/ear pain/coughing/SOB/body aches    Left knee pain - started over a week ago with new change in job role, has been carrying a heavy blower on his back at work and thinks he may have pulled a muscle. He has been taking Tylenol as needed at home with some relief, but desires to take a prescription medication as well for pain. He denies pain radiation, current intensity of pain is 5/10 and tolerable. No swelling/redness/falls.    DM - Stable on current medication regimen, no side effects. Does not keep FBG log, which I encourage him to do. He denies polyuria/polydipsia/foot numbness/tingling/vision changes/hypoglycemia.     Hemoglobin A1C   Date Value Ref Range Status   01/30/2019 6.6 (H) <5.7 % of total Hgb Final     Comment:     For someone without known diabetes, a hemoglobin A1c  value of 6.5% or greater indicates that they may have   diabetes and this should be confirmed with a follow-up   test.     For someone with known diabetes, a value <7% indicates   that their diabetes is well controlled and a value   greater than or equal to 7% indicates suboptimal   control. A1c targets should be individualized based on   duration of diabetes, age, comorbid conditions, and   other considerations.     Currently, no consensus exists regarding use of  hemoglobin A1c for diagnosis of diabetes for children.         11/13/2017 5.9 (H) 4.0 - 5.6 % Final     Comment:     According to ADA guidelines,  "hemoglobin A1c <7.0% represents  optimal control in non-pregnant diabetic patients. Different  metrics may apply to specific patient populations.   Standards of Medical Care in Diabetes-2016.  For the purpose of screening for the presence of diabetes:  <5.7%     Consistent with the absence of diabetes  5.7-6.4%  Consistent with increasing risk for diabetes   (prediabetes)  >or=6.5%  Consistent with diabetes  Currently, no consensus exists for use of hemoglobin A1c  for diagnosis of diabetes for children.  This Hemoglobin A1c assay has significant interference with fetal   hemoglobin   (HbF). The results are invalid for patients with abnormal amounts of   HbF,   including those with known Hereditary Persistence   of Fetal Hemoglobin. Heterozygous hemoglobin variants (HbAS, HbAC,   HbAD, HbAE, HbA2) do not significantly interfere with this assay;   however, presence of multiple variants in a sample may impact the %   interference.     11/14/2013 6.3 (H) 4.5 - 6.2 % Final         HTN - patient is compliant with current medication regimen with good BP control at home - no side effects. Denies CP/SOB/leg swelling    Hypothyroidism - tolerating medication well, no side effects.   Last TSH   Lab Results   Component Value Date    TSH 4.56 (H) 01/30/2019     Patient denies fatigue/generalized arthralgias or myalgia/cold intolerance/tremors/wt gain/dry skin/hair loss        Additional ROS  No F/C/wt changes/fatigue  No dysphagia, sore throat, cough, +rhinorrhea/nasal congestion  No CP/GLEASON/palpitations/swelling  No wheezing/SOB  No nausea/vomiting/abd pain/no diarrhea, no constipation, no blood in stool  No muscle aches, +left knee pain  No rashes  No MSK weakness/HA/tingling/numbness  No anxiety/depression  No dysuria/hematuria  No polyuria/polydipsia/cold or hot intolerance            Review of Systems  See HPI        Physical Exam  /79   Pulse 70   Temp 98.4 °F (36.9 °C)   Ht 5' 7" (1.702 m)   Wt 98.6 kg (217 lb " 7.7 oz)   SpO2 96%   BMI 34.06 kg/m²     GEN: NAD, well developed, pleasant, well nourished  HEENT: NCAT, PERRLA, EOMI, sclera clear, anicteric, TM clear bilaterally with normal light reflex, mild nasal turbinate swelling, MMM with no lesions, O/P clear, +minimal clear nasal discharge,  +moderate frontal/maxillary TTP. No trismus/uvula deviation  NECK: normal, supple with midline trachea, no LAD, no thyromegaly  LUNGS: CTAB, no w/r/r, no increased work of breathing   HEART: RRR, normal S1 and S2, no m/r/g, no edema  ABD: s/nt/nd, NABS  SKIN: normal turgor, no rashes  PSYCH: AOx3, appropriate mood and affect  MSK: warm/well perfused, normal ROM in all extremities, no c/c/e.  KNEE: no gross abnormality on visual exam , bilateral knee with FROM.  Left knee with mild tenderness to deep palpation over the LCL.No crepitus, no joint line TTP.  Normal valgus/varus stress.  NEG anterior/posterior drawer and Lachman's test. No laxity. Normal gait. Calf and thigh are nonTTP. Neurovascularly intact distally. No effusion/redness.           Assessment/Plan:  Vijay Rodriguez Jr. is a 59 y.o. male who presents today for :    Chronic maxillary sinusitis  -     amoxicillin (AMOXIL) 875 MG tablet; Take 1 tablet (875 mg total) by mouth every 12 (twelve) hours.  Dispense: 20 tablet; Refill: 0  -     Ambulatory referral/consult to ENT; Future; Expected date: 05/28/2020  Post-nasal drip  -start Abx. Advised to avoid smoking if at all possible.  -advised to continue supportive therapy and symptomatic management. Flonase/Claritin as needed.  May try nasal rinses.  -stressed hydration (water) and rest, as well as frequent hand washing and covering coughs to prevent spread of respiratory illnesses. Tylenol as needed for fever/muscle aches/headaches  -RTC if Sx worsens or call clinic back if pt has any concerns.      Acute pain of left knee  -     naproxen (NAPROSYN) 500 MG tablet; Take 1 tablet (500 mg total) by mouth 2 (two) times daily  with meals.  Dispense: 30 tablet; Refill: 0  -activity modification d/w patient: avoid prolonged standing and sudden twisting, avoid heavy lifting and provocative movements, advised icing and elevation  -knee ROM stretching and strengthening exercises demonstrated in clinic and handouts given to patient  -may use brace for additional support  -RTC in 4-6 weeks if not better, or sooner if pain worsens.  -patient advised to use OTC Tylenol (325mg tablets) once every 4-6 hours as needed for pain     Controlled type 2 diabetes mellitus without complication, without long-term current use of insulin  -     metFORMIN (GLUCOPHAGE-XR) 500 MG XR 24hr tablet; Take 2 tablets (1,000 mg total) by mouth once daily. Followup Dr.T Eve TREVIÑO 2020 for refills, get labs 2-3 days before (ordered) (may call to schedule a Video or Telephone Virtual visit)  Dispense: 90 tablet; Refill: 1  -     Hemoglobin A1C; Future; Expected date: 05/21/2020  -     CBC Without Differential; Future; Expected date: 05/21/2020  -     Comprehensive metabolic panel; Future; Expected date: 05/21/2020  -     Hepatitis C Antibody; Future; Expected date: 05/21/2020  -     Microalbumin/creatinine urine ratio; Future; Expected date: 05/21/2020  -previous A1c reviewed:   Lab Results   Component Value Date    HGBA1C 6.6 (H) 01/30/2019     -continue current medication regimen  -d/w patient about the need for regular eye care, skin care, daily foot exam, proper nutrition, regular BG monitoring at home     Essential hypertension  -     amLODIPine (NORVASC) 10 MG tablet; Take 1 tablet (10 mg total) by mouth once daily. Followup Dr.T Eve TREVIÑO 2020 for refills, get labs 2-3 days before (ordered) (may call to schedule a Video or Telephone Virtual visit)  Dispense: 90 tablet; Refill: 1  Obesity (BMI 30-39.9)  Tobacco use  Hyperlipidemia, unspecified hyperlipidemia type  -     atorvastatin (LIPITOR) 20 MG tablet; Take 1 tablet (20 mg total) by mouth once daily.  Dispense:  90 tablet; Refill: 3  -     Lipid Panel; Future; Expected date: 05/21/2020  -continue current medication regimen  -DASH diet, regular cardiovascular exercises  -counseled on weight loss  -counseled on smoking cessation    Other specified hypothyroidism  -     TSH; Future; Expected date: 05/21/2020                    Follow PRN/sooner for worsening Sx. Urgent care/ED precautions provided.

## 2020-05-26 ENCOUNTER — TELEPHONE (OUTPATIENT)
Dept: OTOLARYNGOLOGY | Facility: CLINIC | Age: 60
End: 2020-05-26

## 2020-05-26 DIAGNOSIS — R09.81 NASAL CONGESTION: Primary | ICD-10-CM

## 2020-05-26 DIAGNOSIS — J32.9 SINUSITIS, UNSPECIFIED CHRONICITY, UNSPECIFIED LOCATION: ICD-10-CM

## 2020-05-26 NOTE — TELEPHONE ENCOUNTER
----- Message from Tianna Diaz sent at 5/26/2020  1:32 PM CDT -----  Contact: pt  Type:  Patient Returning Call    Who Called: SHUBHAM SAUCEDO JR. [[id number]]    Who Left Message for Patient:Espinoza     Does the patient know what this is regarding?:yes     Best Call Back Number:[phone] (home)     Additional Information:

## 2020-05-26 NOTE — TELEPHONE ENCOUNTER
Called Patient and left message to call me back regarding an appointment that he has to see Dr. Oscar 7:40 Am that is needing to be changed.

## 2020-05-28 ENCOUNTER — TELEPHONE (OUTPATIENT)
Dept: OTOLARYNGOLOGY | Facility: CLINIC | Age: 60
End: 2020-05-28

## 2020-05-28 DIAGNOSIS — J32.0 MAXILLARY SINUSITIS, UNSPECIFIED CHRONICITY: Primary | ICD-10-CM

## 2020-05-28 NOTE — TELEPHONE ENCOUNTER
Patient has an appt on 6/23/20 and will need to have a Covid test 48 hours prior to his appt.  Covid Orders signed

## 2020-06-01 ENCOUNTER — TELEPHONE (OUTPATIENT)
Dept: OTOLARYNGOLOGY | Facility: CLINIC | Age: 60
End: 2020-06-01

## 2020-06-01 NOTE — TELEPHONE ENCOUNTER
PATIENT CALLED OFFICE BACK AND WILL GO FOR COVID TESTING ON Saturday 6/20 AT THE [facility] ON [address].

## 2020-06-01 NOTE — TELEPHONE ENCOUNTER
Left message for patient to call the office back. Patient needs to have a covid test done 48-72 hours prior to his appt with the office on 6/23

## 2020-06-20 ENCOUNTER — CLINICAL SUPPORT (OUTPATIENT)
Dept: URGENT CARE | Facility: CLINIC | Age: 60
End: 2020-06-20
Payer: COMMERCIAL

## 2020-06-20 DIAGNOSIS — R09.81 NASAL CONGESTION: ICD-10-CM

## 2020-06-20 DIAGNOSIS — J32.9 SINUSITIS, UNSPECIFIED CHRONICITY, UNSPECIFIED LOCATION: ICD-10-CM

## 2020-06-20 PROCEDURE — U0003 INFECTIOUS AGENT DETECTION BY NUCLEIC ACID (DNA OR RNA); SEVERE ACUTE RESPIRATORY SYNDROME CORONAVIRUS 2 (SARS-COV-2) (CORONAVIRUS DISEASE [COVID-19]), AMPLIFIED PROBE TECHNIQUE, MAKING USE OF HIGH THROUGHPUT TECHNOLOGIES AS DESCRIBED BY CMS-2020-01-R: HCPCS

## 2020-06-22 LAB — SARS-COV-2 RNA RESP QL NAA+PROBE: NOT DETECTED

## 2020-06-23 ENCOUNTER — OFFICE VISIT (OUTPATIENT)
Dept: OTOLARYNGOLOGY | Facility: CLINIC | Age: 60
End: 2020-06-23
Payer: COMMERCIAL

## 2020-06-23 DIAGNOSIS — J33.9 NASAL POLYPOSIS: Primary | ICD-10-CM

## 2020-06-23 DIAGNOSIS — R06.83 SNORING: ICD-10-CM

## 2020-06-23 DIAGNOSIS — J32.0 CHRONIC MAXILLARY SINUSITIS: ICD-10-CM

## 2020-06-23 PROCEDURE — 31231 PR NASAL ENDOSCOPY, DX: ICD-10-PCS | Mod: S$GLB,,, | Performed by: OTOLARYNGOLOGY

## 2020-06-23 PROCEDURE — 99204 OFFICE O/P NEW MOD 45 MIN: CPT | Mod: 25,S$GLB,, | Performed by: OTOLARYNGOLOGY

## 2020-06-23 PROCEDURE — 31231 NASAL ENDOSCOPY DX: CPT | Mod: S$GLB,,, | Performed by: OTOLARYNGOLOGY

## 2020-06-23 PROCEDURE — 99204 PR OFFICE/OUTPT VISIT, NEW, LEVL IV, 45-59 MIN: ICD-10-PCS | Mod: 25,S$GLB,, | Performed by: OTOLARYNGOLOGY

## 2020-06-23 RX ORDER — FLUTICASONE PROPIONATE 50 MCG
1 SPRAY, SUSPENSION (ML) NASAL 2 TIMES DAILY
Qty: 9.9 ML | Refills: 3 | Status: SHIPPED | OUTPATIENT
Start: 2020-06-23 | End: 2020-09-14

## 2020-06-23 RX ORDER — AZELASTINE 1 MG/ML
1 SPRAY, METERED NASAL 2 TIMES DAILY
Qty: 30 ML | Refills: 3 | Status: SHIPPED | OUTPATIENT
Start: 2020-06-23 | End: 2020-10-14

## 2020-06-23 RX ORDER — MONTELUKAST SODIUM 10 MG/1
10 TABLET ORAL NIGHTLY
Qty: 30 TABLET | Refills: 0 | Status: SHIPPED | OUTPATIENT
Start: 2020-06-23 | End: 2020-07-17

## 2020-06-23 NOTE — PROGRESS NOTES
OTOLARYNGOLOGY CLINIC NOTE  Date:  06/23/2020     Chief complaint:  Chief Complaint   Patient presents with    Sinus Problem       History of Present Illness  Vijay Rodriguez Jr. is a 59 y.o. male  presenting today for a new evaluation and treatment of nasal congestion. He does not get frequent sinus infections requiring abx ( he states that he has only been treated one time with abx however review of medical record showed treatment  in October of 2019 and May 2020) . He does have significant nasal congestion.he has no issues with smell/taste. No headaches, not much mucous/rhinorrhea.  Stuffiness in nose, dryness in nose. No burning sensation in nose. He uses afrin in the am and night and he has used it for years . Last used flonase/astelin a long time ago. Occasionally takes sudafed or claritin. He also uses neilmed once to twice a week. He has never had sinus surgery.    His father had a history of nasal polyps.    He has a seafood allergy- gets  lip swelling.   Skin testing for allergies done a couple of times, less results came back positive when he was tested in later years compared to earlier years .He is not sure what else he was allergic to besides seafood and bees.  He had allergy shots a long time ago (he thinks when he was  in his 20s). He carries an epi pen.     He has difficulty sleeping and wakes up with dry mouth because he mouth breathes do to difficulty with nasal congestion.+snoring , + choking in night time. Feels semi well rested after a full night of sleep however he will fall  asleep if he is riding in a car as a passenger. He also frequently takes naps after lunch .     Plays music and uses an amplifier. He does not think he has had any change in his hearing or any hearing loss. His last hearing test was about 4 years ago.     Past Medical History  Past Medical History:   Diagnosis Date    Diabetes mellitus type II     Hypertension    Prostate cancer    Hypothyroidism  Hyperlipidemia    Past Surgical History  Past Surgical History:   Procedure Laterality Date    PROSTATE SURGERY  2007        Medications  Current Outpatient Medications on File Prior to Visit   Medication Sig Dispense Refill    albuterol (PROVENTIL/VENTOLIN HFA) 90 mcg/actuation inhaler Inhale 2 puffs into the lungs.      amLODIPine (NORVASC) 10 MG tablet Take 1 tablet (10 mg total) by mouth once daily. Followup Dr.T Eve TREVIÑO 2020 for refills, get labs 2-3 days before (ordered) (may call to schedule a Video or Telephone Virtual visit) 90 tablet 1    amoxicillin (AMOXIL) 875 MG tablet Take 1 tablet (875 mg total) by mouth every 12 (twelve) hours. 20 tablet 0    atorvastatin (LIPITOR) 20 MG tablet Take 1 tablet (20 mg total) by mouth once daily. 90 tablet 3    azelastine (ASTELIN) 137 mcg (0.1 %) nasal spray 2 sprays by Nasal route.      benzonatate (TESSALON) 200 MG capsule Take 200 mg by mouth.      celecoxib (CELEBREX) 200 MG capsule Take 200 mg by mouth.      cyclobenzaprine (FLEXERIL) 10 MG tablet Take 10 mg by mouth.      EPINEPHrine (EPIPEN) 0.3 mg/0.3 mL AtIn TAKE 1 AUTO(S) AS NEEDED BY INJECTION ROUTE.  1    fluticasone propionate (FLONASE) 50 mcg/actuation nasal spray SPRAY 2 SPRAYS INTO EACH NOSTRIL EVERY DAY      levothyroxine (SYNTHROID) 25 MCG tablet Take 1 tablet (25 mcg total) by mouth once daily. 90 tablet 1    metFORMIN (GLUCOPHAGE-XR) 500 MG XR 24hr tablet Take 2 tablets (1,000 mg total) by mouth once daily. Followup Dr.T Eve TREVIÑO 2020 for refills, get labs 2-3 days before (ordered) (may call to schedule a Video or Telephone Virtual visit) 90 tablet 1    naproxen (NAPROSYN) 500 MG tablet Take 1 tablet (500 mg total) by mouth 2 (two) times daily with meals. 30 tablet 0    nicotine (NICODERM CQ) 14 mg/24 hr Place 1 patch onto the skin once daily. 21 patch 15    ranitidine (ZANTAC) 300 MG tablet Take 1 tablet by mouth.      tadalafiL (CIALIS) 20 MG Tab Take 20 mg  by mouth.      VITAMIN D2 50,000 unit capsule        No current facility-administered medications on file prior to visit.        Review of Systems  Review of Systems   Constitutional: Negative for fever.   HENT: Negative for hearing loss and nosebleeds.         Ear fullness   Eyes: Positive for double vision (ok with bifocals).   Gastrointestinal: Negative for heartburn.   Neurological: Negative for dizziness.   Endo/Heme/Allergies: Positive for environmental allergies.        Social History   reports that he has been smoking. He has a 7.50 pack-year smoking history. He does not have any smokeless tobacco history on file. He reports current alcohol use. He reports that he does not use drugs.     Family History  Family History   Problem Relation Age of Onset    Cancer- colon Mother 60    Cancer- prostate Father 60    Diabetes Father     Hypertension Father, mother, brother     Cancer- pancreatic Brother 52        Physical Exam     GENERAL: alert, no acute distress.  HEAD: normocephalic.  No tenderness to palpation of face.  SKIN: no lesions of skin of head and neck area.  EYES: lids and lashes normal. Pupils equal and reactive to light. Extraocular motions intact. No proptosis  EARS: external ear without lesion, normal pinna shape and position.  External auditory canal with normal cerumen, tympanic membrane fully visible, no perforation , no retraction. No middle ear effusion. Ossicles intact.   NOSE: external nose without abnormality, see nasal endoscopy note  ORAL CAVITY/OROPHARYNX: dentures no mass or lesion of gingiva/buccal mucosa/floor of mouth/tongue. tongue midline and mobile. No fasciculations.Symmetric palate rise. Uvula midline.   NECK: trachea midline. No discrete palpable thyroid nodule.  No submandibular gland mass nor tenderness. No scars.  LYMPH NODES:No cervical lymphadenopathy.  RESPIRATORY: no stridor, no stertor. Voice normal. Respirations nonlabored.  NEURO: alert, responds to questions  appropriately.   Cranial nerve exam as indicated in above sections and additionally showed intact facial sensation to light touch bilaterally in V1,V2 and V3. Facial movement symmetric with good eye closure and symmetric smile.   PSYCH:mood appropriate      PROCEDURE NOTE  Procedure: diagnostic rigid nasal endoscopy  Indications for procedure: nasal congestion   Post-procedure diagnosis: nasal polyposis    Consent: procedure was explained in detail and verbal consent was obtained.   Anesthesia:4% lidocaine with neosynephrine  Procedure in detail: With the patient in the seated position, the zero degree endoscope was inserted atraumatically into the bilateral nasal cavities and advance to the nasopharynx with the following areas examined with findings as described below.     Nasal cavity: no purulent drainage, mucoid secretions   Septum:no septal perforation. s shaped septum  Turbinates:  Boggy inferior turbinates, polypoid changes of middle turbinates bilateral.  Middle Meati: extensive polyposis leading to medialization of middle turbinate . Polyps do not extend significantly into the nasal passageway  Nasopharynx: no mass or lesion in the nasopharynx.     The scope was removed atraumatically without complication. The patient tolerated the procedure well. Photodocumentation was obtained and uploaded into media section of epic.    Imaging:  The patient does not have any pertinent and/or recent imaging of the head and neck.     Labs:  No recent labs    Assessment  1. Chronic maxillary sinusitis  - Ambulatory referral/consult to ENT    2. Nasal polyposis  - CT [employer] Sinuses without; Future    3. Snoring  - Polysomnogram (CPAP will be added if patient meets diagnostic criteria.); Future  - Ambulatory referral/consult to Sleep Disorders; Future       Plan:  Discussed plan of care with patient in detail and all questions answered. Patient reported understanding of plan of care.   Suspect JEFFREY: although congestion from  nasal polyposis and rhinitis medicamentosa may contribute to poor sleep quality, given body habitus and lack of polyps extending significantly into the nasal airway, I think he should be evaluated by a sleep medicine specialist and have a sleep study      Rhinitis medicamentosa: Stop afrin- can do a wean over 3 weeks while other sprays start to work ; provide pt with instructions with how to do this. Counseled on using nasal saline daily and prn as often as he likes for congestion.       Chronic rhinosinusitis with nasal polyposis: Singulair/flonase/astelin. Will avoid oral steroids given that patient is diabetic . Pt needs FESS with possible septoplasty ( may be required for improved surgical access).     Image guided CT sinus scan without contrast ordered for surgical planning    We began discussion about surgery and indications/risks/benefits. We discussed that sometimes steroid medications can shrink polyps some but they do not make them go away completely and require surgical removal. Sometimes this requires a staged procedure especially in patients at time of first surgery. Informed patient that polyps do recur frequently and that he may need additional surgery in the future. he will need to be on budesonide saline washes postop to help decrease risk of polyp recurrence.We discussed he will have significant nasal congestion postop and may need splints and/or  depending on if septoplasty performed (splints) or significant bleeding ( packing). Discussed risk of CSF leak, vision complications, teeth numbness.      He would like to talk with his work and family and figure out a date that will work for him.  I will have him follow up after this to review imaging and go over definitive surgical plan ( extent of FESS)

## 2020-06-23 NOTE — LETTER
June 30, 2020      Isidro Prado MD  [address] [facility]  [place] [address]           [facility]  [address]  [place] [address]  Phone: [phone]          Patient: Vijay Rodriguez Jr.   MR Number: [id number]   YOB: 1960   Date of Visit: 6/23/2020       Dear Dr. Isidro Prado:    Thank you for referring Vijay Rodriguez to me for evaluation. Attached you will find relevant portions of my assessment and plan of care.    If you have questions, please do not hesitate to call me. I look forward to following Vijay Rodriguez along with you.    Sincerely,    Karen Oscar MD    Enclosure  CC:  No Recipients    If you would like to receive this communication electronically, please contact [email or web address] or [phone] to request more information on [employer] Link access.    For providers and/or their staff who would like to refer a patient to [facility], please contact us through our one-stop-shop provider referral line, [facility], at [phone].    If you feel you have received this communication in error or would no longer like to receive these types of communications, please e-mail [email or web address]

## 2020-06-23 NOTE — PATIENT INSTRUCTIONS
"Information and instructions from your visit with me today:    · Start using the following medication nasal sprays:   · Fluticasone spray:    This medication is a steroid spray. It stays within the nose and does not have absorption into the body that leads to side effects that one has with oral steroid medication. Fluticasone nasal spray is the same as the Flonase brand nasal spray. Discuss with your pharmacist if the price is lower over the counter or with a prescription ( this varies depending on insurance). The medication that is over the counter is the same as the prescription medication. Use this medication as instructed on the prescription, 2 sprays on each side of your nose once daily.     · Azelastine  spray:  This medication is an antihistamine used to treat nasal symptoms of allergy, which works specifically in the nose unlike antihistamine pills which have more of an effect on the whole body. Use this medication as instructed on the prescription, 1 spray on each side of your nose twice daily.     Additional instructions for medication sprays  Place the tip of the medication bottle in your nose and aim slightly up and out on each side to get medication high and deep into your nose and sinuses, and not have it all deposit in the very front of your nose. Aim the tip of the nozzle towards the outer corner of your eye . You can imagine aiming towards the back of your eyeball on each side for this, as opposed to straight back to the center of your nose and head.     You need to use this medication every day regardless of symptoms, as it takes time ( a few weeks) to work and get the benefits. It does not work on an "as needed" basis like taking a decongestant. If your symptoms only occur in a particular season, then the medication can be used seasonally instead of year long. For seasonal symptoms, you should start using the spray twice daily a month before when you normally have symptoms ( for example, if " symptoms start in August, should start at the end of June).     · Start nasal irrigations with saline solution:    SALINE SINUS RINSE (Phani Med brand): You should do a full bottle, half on one side of your nose and half on the other, 1-2 times per day (or more if able to, you cannot do this too much). Follow the instructions on the box: mix the salt packet with clean water (bottle, previously boiled, distilled, etc -- not tap water) to the line on the bottle to make the irrigation.     NASAL SALINE SPRAY ( 0.9%) There are several different brands found in the cold and flu aisle of the pharmacy. You can also use simply saline or other brand of saline spray that delivers saline by gentle mist if you have difficulty or discomfort with neilmed rinse. Always rinse your nose with saline prior to using medication sprays and wait a couple of hours before using again.      · Do not use nasal decongestant sprays such as Afrin or similar products.  This can cause long term physical nasal addiction. Afrin should only be used if having nose bleeds and should not be used for more than 2-3 days in a row . It is a not a medication that should be used for a long period of time. You need to wean off of this- cut usage in half for a week, then in half again the following week, then stop use    It was nice meeting you today, and I look forward to helping you feel better soon. Please don't hesitate to call if you have any other questions or concerns, or if I can be of any assistance in the meantime.      Karen Oscar MD    [facility]    Phone  [phone]    Fax      [phone]        Karen Oscar MD  Otorhinolaryngology

## 2020-06-24 ENCOUNTER — TELEPHONE (OUTPATIENT)
Dept: OTOLARYNGOLOGY | Facility: CLINIC | Age: 60
End: 2020-06-24

## 2020-06-24 DIAGNOSIS — J32.9 SINUSITIS, UNSPECIFIED CHRONICITY, UNSPECIFIED LOCATION: Primary | ICD-10-CM

## 2020-06-24 NOTE — TELEPHONE ENCOUNTER
Called Patient and left message for Patient to call me back regarding needing a Covid Test prior to seeing Dr. Oscar.

## 2020-06-30 ENCOUNTER — OFFICE VISIT (OUTPATIENT)
Dept: ORTHOPEDICS | Facility: CLINIC | Age: 60
End: 2020-06-30
Payer: COMMERCIAL

## 2020-06-30 ENCOUNTER — HOSPITAL ENCOUNTER (OUTPATIENT)
Dept: RADIOLOGY | Facility: HOSPITAL | Age: 60
Discharge: HOME | End: 2020-06-30
Attending: PHYSICIAN ASSISTANT
Payer: COMMERCIAL

## 2020-06-30 VITALS
HEART RATE: 71 BPM | DIASTOLIC BLOOD PRESSURE: 79 MMHG | WEIGHT: 215.5 LBS | BODY MASS INDEX: 33.75 KG/M2 | SYSTOLIC BLOOD PRESSURE: 117 MMHG | TEMPERATURE: 98 F

## 2020-06-30 DIAGNOSIS — M25.562 LEFT KNEE PAIN, UNSPECIFIED CHRONICITY: ICD-10-CM

## 2020-06-30 DIAGNOSIS — M25.562 LEFT KNEE PAIN, UNSPECIFIED CHRONICITY: Primary | ICD-10-CM

## 2020-06-30 PROCEDURE — 3074F PR MOST RECENT SYSTOLIC BLOOD PRESSURE < 130 MM HG: ICD-10-PCS | Mod: CPTII,S$GLB,, | Performed by: PHYSICIAN ASSISTANT

## 2020-06-30 PROCEDURE — 99204 PR OFFICE/OUTPT VISIT, NEW, LEVL IV, 45-59 MIN: ICD-10-PCS | Mod: S$GLB,,, | Performed by: PHYSICIAN ASSISTANT

## 2020-06-30 PROCEDURE — 99999 PR PBB SHADOW E&M-EST. PATIENT-LVL IV: CPT | Mod: PBBFAC,,, | Performed by: PHYSICIAN ASSISTANT

## 2020-06-30 PROCEDURE — 3074F SYST BP LT 130 MM HG: CPT | Mod: CPTII,S$GLB,, | Performed by: PHYSICIAN ASSISTANT

## 2020-06-30 PROCEDURE — 3078F DIAST BP <80 MM HG: CPT | Mod: CPTII,S$GLB,, | Performed by: PHYSICIAN ASSISTANT

## 2020-06-30 PROCEDURE — 73562 X-RAY EXAM OF KNEE 3: CPT | Mod: 26,RT,, | Performed by: RADIOLOGY

## 2020-06-30 PROCEDURE — 73562 XR KNEE ORTHO LEFT WITH FLEXION: ICD-10-PCS | Mod: 26,RT,, | Performed by: RADIOLOGY

## 2020-06-30 PROCEDURE — 73564 XR KNEE ORTHO LEFT WITH FLEXION: ICD-10-PCS | Mod: 26,LT,, | Performed by: RADIOLOGY

## 2020-06-30 PROCEDURE — 3078F PR MOST RECENT DIASTOLIC BLOOD PRESSURE < 80 MM HG: ICD-10-PCS | Mod: CPTII,S$GLB,, | Performed by: PHYSICIAN ASSISTANT

## 2020-06-30 PROCEDURE — 99204 OFFICE O/P NEW MOD 45 MIN: CPT | Mod: S$GLB,,, | Performed by: PHYSICIAN ASSISTANT

## 2020-06-30 PROCEDURE — 3008F BODY MASS INDEX DOCD: CPT | Mod: CPTII,S$GLB,, | Performed by: PHYSICIAN ASSISTANT

## 2020-06-30 PROCEDURE — 99999 PR PBB SHADOW E&M-EST. PATIENT-LVL IV: ICD-10-PCS | Mod: PBBFAC,,, | Performed by: PHYSICIAN ASSISTANT

## 2020-06-30 PROCEDURE — 73564 X-RAY EXAM KNEE 4 OR MORE: CPT | Mod: 26,LT,, | Performed by: RADIOLOGY

## 2020-06-30 PROCEDURE — 3008F PR BODY MASS INDEX (BMI) DOCUMENTED: ICD-10-PCS | Mod: CPTII,S$GLB,, | Performed by: PHYSICIAN ASSISTANT

## 2020-06-30 PROCEDURE — 73562 X-RAY EXAM OF KNEE 3: CPT | Mod: TC,RT

## 2020-06-30 RX ORDER — NAPROXEN 500 MG/1
500 TABLET ORAL 2 TIMES DAILY WITH MEALS
Qty: 60 TABLET | Refills: 0 | Status: SHIPPED | OUTPATIENT
Start: 2020-06-30 | End: 2020-07-21 | Stop reason: SDUPTHER

## 2020-06-30 NOTE — PROGRESS NOTES
Subjective:      Patient ID: Vijay Rodirguez Jr. is a 59 y.o. male.    Chief Complaint: Pain and Swelling of the Left Knee    HPI  59 year old male presents with chief complaint of left knee pain x 1 month. He denies trauma. Pain is anterior and medial. It is worse with bending it and walking a lot. He took naproxen and tylenol with some relief. He reports swelling, popping, locking, catching, and giving way. He does not use assistive devices.   Review of Systems   Constitution: Negative for chills, fever and night sweats.   Cardiovascular: Negative for chest pain.   Respiratory: Negative for cough and shortness of breath.    Hematologic/Lymphatic: Does not bruise/bleed easily.   Skin: Negative for color change.   Gastrointestinal: Negative for heartburn.   Genitourinary: Negative for dysuria.   Neurological: Negative for numbness and paresthesias.   Psychiatric/Behavioral: Negative for altered mental status.   Allergic/Immunologic: Negative for persistent infections.         Objective:            General    Vitals reviewed.  Constitutional: He is oriented to person, place, and time. He appears well-developed and well-nourished.   HENT:   Head: Atraumatic.   Neck: Neck supple.   Cardiovascular: Normal rate.    Pulmonary/Chest: Effort normal.   Neurological: He is alert and oriented to person, place, and time.   Psychiatric: He has a normal mood and affect.             General :   alert, appears stated age and cooperative   Gait: Antalgic. The patient can bear weight on the injured extremity.   Left Lower Extremity  Hip Palpation:  no tenderness over the greater  trochanter   Hip ROM: 100% of normal    Knee Effusion:  None.   Ecchymosis:  none   Knee ROM:  0 to 125 degrees without subpatellar   crepitance.   Patella:  Patella does track normally.  Patellar apprehension test: negative  Patellar compression test: negative   Tenderness: medial joint line   Stability:  Lachman's test: negative  Posterior drawer:  negative  Medial collateral ligament: negative  Lateral collateral ligament: negative         Graciela's Test:  positive    Sensation:   intact to light touch   Pulses: normal DP and PT pulses         X-ray: ordered and reviewed by myself. Mild OA.           Assessment:       Encounter Diagnosis   Name Primary?    Left knee pain, unspecified chronicity Yes          Plan:         MRI was ordered to r/o meniscus tear. Naproxen refilled. RTC pending results.

## 2020-07-06 ENCOUNTER — HOSPITAL ENCOUNTER (OUTPATIENT)
Dept: RADIOLOGY | Facility: HOSPITAL | Age: 60
Discharge: HOME | End: 2020-07-06
Attending: PHYSICIAN ASSISTANT
Payer: COMMERCIAL

## 2020-07-06 DIAGNOSIS — M25.562 LEFT KNEE PAIN, UNSPECIFIED CHRONICITY: ICD-10-CM

## 2020-07-06 PROCEDURE — 73721 MRI JNT OF LWR EXTRE W/O DYE: CPT | Mod: TC,LT

## 2020-07-06 PROCEDURE — 73721 MRI JNT OF LWR EXTRE W/O DYE: CPT | Mod: 26,LT,, | Performed by: RADIOLOGY

## 2020-07-06 PROCEDURE — 73721 MRI KNEE WITHOUT CONTRAST LEFT: ICD-10-PCS | Mod: 26,LT,, | Performed by: RADIOLOGY

## 2020-07-07 ENCOUNTER — TELEPHONE (OUTPATIENT)
Dept: ORTHOPEDICS | Facility: CLINIC | Age: 60
End: 2020-07-07

## 2020-07-07 NOTE — TELEPHONE ENCOUNTER
Spoke to patient regarding MRI results. Will refer him to sports medicine regarding his meniscus tear. Consult was scheduled.

## 2020-07-07 NOTE — TELEPHONE ENCOUNTER
Spoke with patient to let him know that his appt with Sport Medicine is scheduled for 7/14 @ 1:30 with Dr Phillips located at [place]. Patient verbalized understanding.

## 2020-07-13 ENCOUNTER — PATIENT OUTREACH (OUTPATIENT)
Dept: ADMINISTRATIVE | Facility: OTHER | Age: 60
End: 2020-07-13

## 2020-07-13 DIAGNOSIS — E11.9 TYPE 2 DIABETES MELLITUS WITHOUT COMPLICATION, WITHOUT LONG-TERM CURRENT USE OF INSULIN: Primary | ICD-10-CM

## 2020-07-13 NOTE — PROGRESS NOTES
Care Everywhere: updated  Immunization:   Health Maintenance:   Media Review: reviewed for outside eye exam repor  Legacy Review:   Order placed: diabetic eye screening photo  Upcoming appts:

## 2020-07-14 ENCOUNTER — OFFICE VISIT (OUTPATIENT)
Dept: SPORTS MEDICINE | Facility: CLINIC | Age: 60
End: 2020-07-14
Payer: COMMERCIAL

## 2020-07-14 VITALS
SYSTOLIC BLOOD PRESSURE: 143 MMHG | WEIGHT: 215 LBS | BODY MASS INDEX: 33.74 KG/M2 | HEIGHT: 67 IN | DIASTOLIC BLOOD PRESSURE: 88 MMHG | HEART RATE: 82 BPM

## 2020-07-14 DIAGNOSIS — S83.249A MEDIAL MENISCUS TEAR: ICD-10-CM

## 2020-07-14 DIAGNOSIS — Z01.818 PRE-OP TESTING: ICD-10-CM

## 2020-07-14 DIAGNOSIS — M25.562 LEFT KNEE PAIN, UNSPECIFIED CHRONICITY: Primary | ICD-10-CM

## 2020-07-14 LAB
ALBUMIN SERPL-MCNC: 4.1 G/DL (ref 3.6–5.1)
ALBUMIN/CREAT UR: 2 MCG/MG CREAT
ALBUMIN/GLOB SERPL: 1.4 (CALC) (ref 1–2.5)
ALP SERPL-CCNC: 65 U/L (ref 35–144)
ALT SERPL-CCNC: 19 U/L (ref 9–46)
AST SERPL-CCNC: 16 U/L (ref 10–35)
BILIRUB SERPL-MCNC: 0.2 MG/DL (ref 0.2–1.2)
BUN SERPL-MCNC: 15 MG/DL (ref 7–25)
BUN/CREAT SERPL: ABNORMAL (CALC) (ref 6–22)
CALCIUM SERPL-MCNC: 9.3 MG/DL (ref 8.6–10.3)
CHLORIDE SERPL-SCNC: 105 MMOL/L (ref 98–110)
CHOLEST SERPL-MCNC: 168 MG/DL
CHOLEST/HDLC SERPL: 4.9 (CALC)
CO2 SERPL-SCNC: 24 MMOL/L (ref 20–32)
CREAT SERPL-MCNC: 1.02 MG/DL (ref 0.7–1.33)
CREAT UR-MCNC: 123 MG/DL (ref 20–320)
ERYTHROCYTE [DISTWIDTH] IN BLOOD BY AUTOMATED COUNT: 13.5 % (ref 11–15)
GFRSERPLBLD MDRD-ARVRAT: 81 ML/MIN/1.73M2
GLOBULIN SER CALC-MCNC: 3 G/DL (CALC) (ref 1.9–3.7)
GLUCOSE SERPL-MCNC: 165 MG/DL (ref 65–99)
HBA1C MFR BLD: 8 % OF TOTAL HGB
HCT VFR BLD AUTO: 42.4 % (ref 38.5–50)
HCV AB S/CO SERPL IA: 2.1
HCV AB SERPL QL IA: REACTIVE
HCV RNA SERPL NAA+PROBE-ACNC: ABNORMAL IU/ML
HCV RNA SERPL NAA+PROBE-LOG IU: ABNORMAL LOG IU/ML
HDLC SERPL-MCNC: 34 MG/DL
HGB BLD-MCNC: 13.9 G/DL (ref 13.2–17.1)
LDLC SERPL CALC-MCNC: 102 MG/DL (CALC)
MCH RBC QN AUTO: 29.4 PG (ref 27–33)
MCHC RBC AUTO-ENTMCNC: 32.8 G/DL (ref 32–36)
MCV RBC AUTO: 89.8 FL (ref 80–100)
MICROALBUMIN UR-MCNC: 0.2 MG/DL
NONHDLC SERPL-MCNC: 134 MG/DL (CALC)
PLATELET # BLD AUTO: 358 THOUSAND/UL (ref 140–400)
PMV BLD REES-ECKER: 9.9 FL (ref 7.5–12.5)
POTASSIUM SERPL-SCNC: 4.6 MMOL/L (ref 3.5–5.3)
PROT SERPL-MCNC: 7.1 G/DL (ref 6.1–8.1)
RBC # BLD AUTO: 4.72 MILLION/UL (ref 4.2–5.8)
SODIUM SERPL-SCNC: 135 MMOL/L (ref 135–146)
TRIGL SERPL-MCNC: 203 MG/DL
TSH SERPL-ACNC: 4.74 MIU/L (ref 0.4–4.5)
WBC # BLD AUTO: 6.5 THOUSAND/UL (ref 3.8–10.8)

## 2020-07-14 PROCEDURE — 99999 PR PBB SHADOW E&M-EST. PATIENT-LVL IV: CPT | Mod: PBBFAC,,, | Performed by: ORTHOPAEDIC SURGERY

## 2020-07-14 PROCEDURE — 3079F PR MOST RECENT DIASTOLIC BLOOD PRESSURE 80-89 MM HG: ICD-10-PCS | Mod: CPTII,S$GLB,, | Performed by: ORTHOPAEDIC SURGERY

## 2020-07-14 PROCEDURE — 3079F DIAST BP 80-89 MM HG: CPT | Mod: CPTII,S$GLB,, | Performed by: ORTHOPAEDIC SURGERY

## 2020-07-14 PROCEDURE — 3077F SYST BP >= 140 MM HG: CPT | Mod: CPTII,S$GLB,, | Performed by: ORTHOPAEDIC SURGERY

## 2020-07-14 PROCEDURE — 3077F PR MOST RECENT SYSTOLIC BLOOD PRESSURE >= 140 MM HG: ICD-10-PCS | Mod: CPTII,S$GLB,, | Performed by: ORTHOPAEDIC SURGERY

## 2020-07-14 PROCEDURE — 3008F BODY MASS INDEX DOCD: CPT | Mod: CPTII,S$GLB,, | Performed by: ORTHOPAEDIC SURGERY

## 2020-07-14 PROCEDURE — 99203 OFFICE O/P NEW LOW 30 MIN: CPT | Mod: S$GLB,,, | Performed by: ORTHOPAEDIC SURGERY

## 2020-07-14 PROCEDURE — 99999 PR PBB SHADOW E&M-EST. PATIENT-LVL IV: ICD-10-PCS | Mod: PBBFAC,,, | Performed by: ORTHOPAEDIC SURGERY

## 2020-07-14 PROCEDURE — 99203 PR OFFICE/OUTPT VISIT, NEW, LEVL III, 30-44 MIN: ICD-10-PCS | Mod: S$GLB,,, | Performed by: ORTHOPAEDIC SURGERY

## 2020-07-14 PROCEDURE — 3008F PR BODY MASS INDEX (BMI) DOCUMENTED: ICD-10-PCS | Mod: CPTII,S$GLB,, | Performed by: ORTHOPAEDIC SURGERY

## 2020-07-14 NOTE — LETTER
July 15, 2020      Zehra Garcia PA-C  [address]  [facility] [address]           [facility]  [address]  [facility] [address]  Phone: [phone]          Patient: Vijay Rodriguez Jr.   MR Number: [id number]   YOB: 1960   Date of Visit: 7/14/2020       Dear Zehra Garcia:    Thank you for referring Vijay Rodriguez to me for evaluation. Attached you will find relevant portions of my assessment and plan of care.    If you have questions, please do not hesitate to call me. I look forward to following Vijay Rodriguez along with you.    Sincerely,    Jose Luis Phillips MD    Enclosure  CC:  No Recipients    If you would like to receive this communication electronically, please contact [email or web address] or [phone] to request more information on [employer] access.    For providers and/or their staff who would like to refer a patient to [facility], please contact us through our one-stop-shop provider referral line, [facility], at [phone].    If you feel you have received this communication in error or would no longer like to receive these types of communications, please e-mail [email or web address]

## 2020-07-14 NOTE — PROGRESS NOTES
CC: Left knee pain    59 y.o. Male with a history of left knee pain who since May 2020 without STEPHANIE. Previously tried naproxen and tylenol with some relief.  Referred by Zehra Garcia PA-C following MRI results.      He reports that the pain and weakness. It also bothers him at night.      + mechanical symptoms, - instability    Is affecting ADLs.  Pain is 5/10 at it's worst.    REVIEW OF SYSTEMS:  Constitution: Negative. Negative for chills, fever and night sweats.   HENT: Negative for congestion and headaches.    Eyes: Negative for blurred vision, left vision loss and right vision loss.   Cardiovascular: Negative for chest pain and syncope.   Respiratory: Negative for cough and shortness of breath.    Endocrine: Negative for polydipsia, polyphagia and polyuria.   Hematologic/Lymphatic: Negative for bleeding problem. Does not bruise/bleed easily.   Skin: Negative for dry skin, itching and rash.   Musculoskeletal: Negative for falls. Positive for left knee pain and  muscle weakness.   Gastrointestinal: Negative for abdominal pain and bowel incontinence.   Genitourinary: Negative for bladder incontinence and nocturia.   Neurological: Negative for disturbances in coordination, loss of balance and seizures.   Psychiatric/Behavioral: Negative for depression. The patient does not have insomnia.    Allergic/Immunologic: Negative for hives and persistent infections.     PAST MEDICAL HISTORY:    Past Medical History:   Diagnosis Date    Diabetes mellitus type II     Hypertension        PAST SURGICAL HISTORY:   Past Surgical History:   Procedure Laterality Date    PROSTATE SURGERY  2007       FAMILY HISTORY:   Family History   Problem Relation Age of Onset    Cancer Mother 60        colon    Hypertension Mother     Cancer Father 60        prostate    Diabetes Father     Hypertension Father     Hypertension Brother     Cancer Brother 52        pancreatic       SOCIAL HISTORY:   Social History     Socioeconomic  History    Marital status:      Spouse name: Not on file    Number of children: Not on file    Years of education: Not on file    Highest education level: Not on file   Occupational History     Employer: [email or web address]   Social Needs    Financial resource strain: Not on file    Food insecurity     Worry: Not on file     Inability: Not on file    Transportation needs     Medical: Not on file     Non-medical: Not on file   Tobacco Use    Smoking status: Current Every Day Smoker     Packs/day: 0.25     Years: 30.00     Pack years: 7.50   Substance and Sexual Activity    Alcohol use: Yes     Comment: 6 pk beer per week    Drug use: No    Sexual activity: Not on file   Lifestyle    Physical activity     Days per week: Not on file     Minutes per session: Not on file    Stress: Not on file   Relationships    Social connections     Talks on phone: Not on file     Gets together: Not on file     Attends Roman [employer] service: Not on file     Active member of club or organization: Not on file     Attends meetings of clubs or organizations: Not on file     Relationship status: Not on file   Other Topics Concern    Not on file   Social History Narrative    Not on file       MEDICATIONS:     Current Outpatient Medications:     albuterol (PROVENTIL/VENTOLIN HFA) 90 mcg/actuation inhaler, Inhale 2 puffs into the lungs., Disp: , Rfl:     amLODIPine (NORVASC) 10 MG tablet, Take 1 tablet (10 mg total) by mouth once daily. Followup Dr.T Prado NOV 2020 for refills, get labs 2-3 days before (ordered) (may call to schedule a Video or Telephone Virtual visit), Disp: 90 tablet, Rfl: 1    amoxicillin (AMOXIL) 875 MG tablet, Take 1 tablet (875 mg total) by mouth every 12 (twelve) hours. (Patient not taking: Reported on 6/30/2020), Disp: 20 tablet, Rfl: 0    atorvastatin (LIPITOR) 20 MG tablet, Take 1 tablet (20 mg total) by mouth once daily., Disp: 90 tablet, Rfl: 3    azelastine (ASTELIN) 137 mcg (0.1 %) nasal spray, 1  spray (137 mcg total) by Nasal route 2 (two) times daily., Disp: 30 mL, Rfl: 3    benzonatate (TESSALON) 200 MG capsule, Take 200 mg by mouth., Disp: , Rfl:     celecoxib (CELEBREX) 200 MG capsule, Take 200 mg by mouth., Disp: , Rfl:     cyclobenzaprine (FLEXERIL) 10 MG tablet, Take 10 mg by mouth., Disp: , Rfl:     EPINEPHrine (EPIPEN) 0.3 mg/0.3 mL AtIn, TAKE 1 AUTO(S) AS NEEDED BY INJECTION ROUTE., Disp: , Rfl: 1    fluticasone propionate (FLONASE) 50 mcg/actuation nasal spray, 1 spray (50 mcg total) by Each Nostril route 2 (two) times daily., Disp: 9.9 mL, Rfl: 3    levothyroxine (SYNTHROID) 25 MCG tablet, Take 1 tablet (25 mcg total) by mouth once daily., Disp: 90 tablet, Rfl: 1    metFORMIN (GLUCOPHAGE-XR) 500 MG XR 24hr tablet, Take 2 tablets (1,000 mg total) by mouth once daily. Followup Dr.T Prado NOV 2020 for refills, get labs 2-3 days before (ordered) (may call to schedule a Video or Telephone Virtual visit), Disp: 90 tablet, Rfl: 1    montelukast (SINGULAIR) 10 mg tablet, Take 1 tablet (10 mg total) by mouth every evening., Disp: 30 tablet, Rfl: 0    naproxen (NAPROSYN) 500 MG tablet, Take 1 tablet (500 mg total) by mouth 2 (two) times daily with meals., Disp: 60 tablet, Rfl: 0    nicotine (NICODERM CQ) 14 mg/24 hr, Place 1 patch onto the skin once daily., Disp: 21 patch, Rfl: 15    ranitidine (ZANTAC) 300 MG tablet, Take 1 tablet by mouth., Disp: , Rfl:     tadalafiL (CIALIS) 20 MG Tab, Take 20 mg by mouth., Disp: , Rfl:     VITAMIN D2 50,000 unit capsule, , Disp: , Rfl:     ALLERGIES:   Review of patient's allergies indicates:   Allergen Reactions    Hymenoptera allergenic extract Anaphylaxis    Insects extract     Lisinopril Other (See Comments)     cough    Shellfish containing products Hives       VITAL SIGNS:   There were no vitals taken for this visit.     PHYSICAL EXAMINATION  General:  The patient is alert and oriented x 3.  Mood is pleasant.  Observation of ears, eyes and nose  reveal no gross abnormalities.  No labored breathing observed.    LEFT KNEE EXAMINATION     OBSERVATION / INSPECTION   Gait:   Antalgic   Alignment:  Neutral   Scars:   None   Muscle atrophy: Mild  Effusion:  None   Warmth:  None   Discoloration:   none     TENDERNESS / CREPITUS (T / C):          T / C      T / C   Patella   - / -   Lateral joint line   - / -   Peripatellar medial  -  Medial joint line    + / -   Peripatellar lateral -  Medial plica   - / -   Patellar tendon -   Popliteal fossa   - / -   Quad tendon   -   Gastrocnemius   -   Prepatellar Bursa - / -   Quadricep   -   Tibial tubercle  -  Thigh/hamstring  -   Pes anserine/HS -  Fibula    -   ITB   - / -  Tibia     -   Tib/fib joint  - / -  LCL    -     MFC   - / -   MCL: Proximal  -    LFC   - / -    Distal   -          ROM: (* = pain)  PASSIVE   ACTIVE    Left :   5 / 0 / 125   5 / 0 / 120     Right :    5 / 0 / 145   5 / 0 / 145    Patellofemoral examination:  See above noted areas of tenderness.   Patella position    Subluxation / dislocation: Centered           Sup. / Inf;   Normal   Crepitus (PF):    Absent   Patellar Mobility:       Medial-lateral:   Normal    Superior-inferior:  Normal    Inferior tilt   Normal    Patellar tendon:  Normal   Lateral tilt:    Normal   J-sign:     None   Patellofemoral grind:   No pain       MENISCAL SIGNS:     Pain on terminal extension:  +  Pain on terminal flexion:  +  Gracielas maneuver:  + (for pain)  Squat     + (for pain)    LIGAMENT EXAMINATION:  ACL / Lachman:  normal (-1 to 2mm)    PCL-Post.  drawer: normal 0 to 2mm  MCL- Valgus:  normal 0 to 2mm  LCL- Varus:  normal 0 to 2mm  Pivot shift: normal (Equal)   Dial Test: difference c/w other side   At 30° flexion: normal (< 5°)    At 90° flexion: normal (< 5°)   Reverse Pivot Shift:   normal (Equal)     STRENGTH: (* = with pain) PAINFUL SIDE   Quadricep   5/5   Hamstrin/5    EXTREMITY NEURO-VASCULAR EXAMINATION:   Sensation:  Grossly intact to  light touch all dermatomal regions.   Motor Function:  Fully intact motor function at hip, knee, foot and ankle    DTRs;  quadriceps and  achilles 2+.  No clonus and downgoing Babinski.    Vascular status:  DP and PT pulses 2+, brisk capillary refill, symmetric.     XRAY (6/30/20):  Right: No fracture dislocation bone destruction or OCD seen.  There is mild DJD.     Left: There is mild DJD and a mild varus deformity.    MRI (7/6/20):  Complex tear of the posterior horn medial meniscus with signal heterogeneity at the posterior root attachment suggesting component of tear extension.     Knee joint effusion.     Mild chondral degenerative change of the medial compartment     Partially ruptured popliteal cyst.     ASSESSMENT: Left knee pain, medial meniscus tear    PLAN:   1. Joint aspiration (20cc) today  2. Physical therapy  3. 3D knee sleeve  4. Will call when interested in surgery    Treatment options were discussed with the patient about his knee.  I reviewed the MRI images with his, including the relevant anatomy, and what this means for his knee.    We discussed both non-operative and operative options for his knee and the risks and benefits of each.    I feel like he would benefit from surgery for his knee.  After a discussion of specific risks and benefits, he would like to proceed with setting this up.    These risks include: bleeding, infection, scarring, damage to neurovascular structures, damage to cartilage, stiffness, blood clots, pulmonary embolism, swelling, compartment syndrome, need for further surgery, and the risks of anesthesia.      He verbalized his understanding of these risks and wished to proceed with surgery.    A total of 25 minutes were spent face-to-face with the patient during this encounter and over half of that time was spent on counseling about treatment options including his choice for surgery and coordination of his care for his preoperative visits, surgery and post-operative  rehab.  We also had a detailed discussion of his expectation level for this procedure as well as any limitations at home or work and with exercising following surgery.     The operative plan is:    left   1. Arthroscopic partial meniscectomy  2. Possible arthroscopic synovectomy  3. Possible arthroscopic loose body removal  4. Possible arthroscopic chondroplasty    Position: Supine    Patient will  need medical clearance prior to the pre-operative appointment.    If he wishes to proceed, we'll get his scheduled for this at his convenience around his work schedule.

## 2020-07-15 ENCOUNTER — TELEPHONE (OUTPATIENT)
Dept: OTOLARYNGOLOGY | Facility: CLINIC | Age: 60
End: 2020-07-15

## 2020-07-15 ENCOUNTER — TELEPHONE (OUTPATIENT)
Dept: SLEEP MEDICINE | Facility: HOSPITAL | Age: 60
End: 2020-07-15

## 2020-07-15 DIAGNOSIS — G47.33 OSA (OBSTRUCTIVE SLEEP APNEA): Primary | ICD-10-CM

## 2020-07-15 NOTE — TELEPHONE ENCOUNTER
Patient called me back and I informed him that if he does not hear back within 5-10 days from Sleep study to call them.  Patient has the telephone number.

## 2020-07-15 NOTE — TELEPHONE ENCOUNTER
Called Patient and no answer, left message to call me regarding that Dr. Oscar changed his Sleep Study to a at home sleep study.

## 2020-07-21 ENCOUNTER — TELEPHONE (OUTPATIENT)
Dept: FAMILY MEDICINE | Facility: CLINIC | Age: 60
End: 2020-07-21

## 2020-07-21 DIAGNOSIS — R76.8 POSITIVE HEPATITIS C ANTIBODY TEST: ICD-10-CM

## 2020-07-21 DIAGNOSIS — E03.8 OTHER SPECIFIED HYPOTHYROIDISM: Primary | ICD-10-CM

## 2020-07-21 DIAGNOSIS — E11.9 CONTROLLED TYPE 2 DIABETES MELLITUS WITHOUT COMPLICATION, WITHOUT LONG-TERM CURRENT USE OF INSULIN: ICD-10-CM

## 2020-07-21 RX ORDER — NAPROXEN 500 MG/1
500 TABLET ORAL 2 TIMES DAILY WITH MEALS
Qty: 60 TABLET | Refills: 1 | Status: SHIPPED | OUTPATIENT
Start: 2020-07-21 | End: 2020-07-23 | Stop reason: SDUPTHER

## 2020-07-21 RX ORDER — LEVOTHYROXINE SODIUM 50 UG/1
50 TABLET ORAL
Qty: 90 TABLET | Refills: 1 | Status: SHIPPED | OUTPATIENT
Start: 2020-07-21 | End: 2021-03-08 | Stop reason: SDUPTHER

## 2020-07-21 NOTE — TELEPHONE ENCOUNTER
----- Message from Dariana Anthony sent at 7/21/2020 12:50 PM CDT -----  Regarding: rt a missed call back to   Type:  Patient Returning Call    Who Called: SHUBHAM SAUCEDO JR. [[id number]]    Who Left Message for Patient:shahrzad bains    Does the patient know what this is regarding?yes    Best Call Back Number:[phone]    Additional Information:

## 2020-07-21 NOTE — TELEPHONE ENCOUNTER
Left VM for pt to call back to discuss results.   Increased Levothyroxine dose to 50mcg daily based on TSH. Will discuss his dosing of Metformin for better diabetic control when patient calls back. He also tested postive for HepC Ab (normal LFTs) - will ref to HEP.      Other specified hypothyroidism  -     levothyroxine (SYNTHROID) 50 MCG tablet; Take 1 tablet (50 mcg total) by mouth before breakfast. Followup Dr.T Prado OCT-2020 for refills, get labs 2-3 days before (ordered)(may call to schedule a Video or Telephone Virtual visit)  Dispense: 90 tablet; Refill: 1    Positive hepatitis C antibody test  -     Ambulatory referral/consult to Hepatology; Future; Expected date: 07/28/2020

## 2020-07-22 ENCOUNTER — TELEPHONE (OUTPATIENT)
Dept: TRANSPLANT | Facility: CLINIC | Age: 60
End: 2020-07-22

## 2020-07-22 NOTE — TELEPHONE ENCOUNTER
----- Message from Nani Wise sent at 7/22/2020  2:35 PM CDT -----    Pt chart sent to nurse for review.     .       ----- Message -----  From: Agueda Harley  Sent: 7/22/2020   1:43 PM CDT  To: [facility] Referral Pool      ----- Message -----  From: Meseret Hernandez MA  Sent: 7/22/2020   1:33 PM CDT  To: Hepatology Scheduling    Patient has a referral in for hepatology can you please help with scheduling

## 2020-07-23 ENCOUNTER — TELEPHONE (OUTPATIENT)
Dept: HEPATOLOGY | Facility: CLINIC | Age: 60
End: 2020-07-23

## 2020-07-23 ENCOUNTER — DOCUMENTATION ONLY (OUTPATIENT)
Dept: TRANSPLANT | Facility: CLINIC | Age: 60
End: 2020-07-23

## 2020-07-23 RX ORDER — NAPROXEN 500 MG/1
500 TABLET ORAL 2 TIMES DAILY WITH MEALS
Qty: 60 TABLET | Refills: 1 | Status: SHIPPED | OUTPATIENT
Start: 2020-07-23 | End: 2020-08-22

## 2020-07-23 NOTE — LETTER
July 23, 2020    Vijay Rodriguez  [address] [place] [id number]      Dear Vijay Rodriguez:    Your doctor has referred you to the [facility]. We are sending this letter to remind you to make an appointment with us to complete the referral process.     Please call us at [phone] to schedule an appointment. We look forward to seeing you soon.     If you received a call and have been scheduled, please disregard this letter.       Sincerely,        [facility]   [address] [place] [id number]  [phone]

## 2020-07-23 NOTE — NURSING
Pt records reviewed.   Pt will be referred to Hepatology.    Initial referral received  from the workque.   Referring Provider/diagnosis  Isidro Prado MD     HCV Ab+  HCV RNA NEG>  Please schedule in gen hepatology as pt does not need HCV treatment.      Referral letter sent to patient.

## 2020-07-28 ENCOUNTER — PATIENT OUTREACH (OUTPATIENT)
Dept: ADMINISTRATIVE | Facility: OTHER | Age: 60
End: 2020-07-28

## 2020-07-28 ENCOUNTER — TELEPHONE (OUTPATIENT)
Dept: SLEEP MEDICINE | Facility: HOSPITAL | Age: 60
End: 2020-07-28

## 2020-07-28 NOTE — TELEPHONE ENCOUNTER
Called patient to schedule HST, no answer left voicemail for patient to call back to schedule. Also sent letter via patient portal.

## 2020-07-28 NOTE — PROGRESS NOTES
Requested updates within Care Everywhere.  Patient's chart was reviewed for overdue EDUARDA topics.  Immunizations reconciled.

## 2020-07-31 ENCOUNTER — OFFICE VISIT (OUTPATIENT)
Dept: URGENT CARE | Facility: CLINIC | Age: 60
End: 2020-07-31
Payer: COMMERCIAL

## 2020-07-31 VITALS
OXYGEN SATURATION: 98 % | DIASTOLIC BLOOD PRESSURE: 89 MMHG | HEART RATE: 83 BPM | SYSTOLIC BLOOD PRESSURE: 135 MMHG | TEMPERATURE: 98 F

## 2020-07-31 DIAGNOSIS — K62.89 ANAL OR RECTAL PAIN: Primary | ICD-10-CM

## 2020-07-31 DIAGNOSIS — K61.1 PERIRECTAL ABSCESS: ICD-10-CM

## 2020-07-31 PROCEDURE — 96372 PR INJECTION,THERAP/PROPH/DIAG2ST, IM OR SUBCUT: ICD-10-PCS | Mod: S$GLB,,, | Performed by: PHYSICIAN ASSISTANT

## 2020-07-31 PROCEDURE — 99214 OFFICE O/P EST MOD 30 MIN: CPT | Mod: 25,S$GLB,, | Performed by: PHYSICIAN ASSISTANT

## 2020-07-31 PROCEDURE — 96372 THER/PROPH/DIAG INJ SC/IM: CPT | Mod: S$GLB,,, | Performed by: PHYSICIAN ASSISTANT

## 2020-07-31 PROCEDURE — 99214 PR OFFICE/OUTPT VISIT, EST, LEVL IV, 30-39 MIN: ICD-10-PCS | Mod: 25,S$GLB,, | Performed by: PHYSICIAN ASSISTANT

## 2020-07-31 RX ORDER — HYDROCODONE BITARTRATE AND ACETAMINOPHEN 5; 325 MG/1; MG/1
1 TABLET ORAL EVERY 6 HOURS PRN
Qty: 15 TABLET | Refills: 0 | Status: SHIPPED | OUTPATIENT
Start: 2020-07-31 | End: 2021-08-27

## 2020-07-31 RX ORDER — AMOXICILLIN AND CLAVULANATE POTASSIUM 875; 125 MG/1; MG/1
1 TABLET, FILM COATED ORAL 2 TIMES DAILY
Qty: 10 TABLET | Refills: 0 | Status: SHIPPED | OUTPATIENT
Start: 2020-07-31 | End: 2020-08-05

## 2020-07-31 RX ORDER — HYDROCORTISONE ACETATE 25 MG/1
25 SUPPOSITORY RECTAL 2 TIMES DAILY
Qty: 20 SUPPOSITORY | Refills: 0 | Status: SHIPPED | OUTPATIENT
Start: 2020-07-31 | End: 2020-08-10

## 2020-07-31 RX ORDER — CEFTRIAXONE 1 G/1
1 INJECTION, POWDER, FOR SOLUTION INTRAMUSCULAR; INTRAVENOUS
Status: COMPLETED | OUTPATIENT
Start: 2020-07-31 | End: 2020-07-31

## 2020-07-31 RX ADMIN — CEFTRIAXONE 1 G: 1 INJECTION, POWDER, FOR SOLUTION INTRAMUSCULAR; INTRAVENOUS at 06:07

## 2020-07-31 NOTE — PATIENT INSTRUCTIONS
Diagnosing Hemorrhoids    To diagnose hemorrhoids, your healthcare provider will rule out other problems and determine how bad your hemorrhoids are. After the evaluation, your healthcare provider will help you decide on a treatment plan thats best for you.  Medical history  A medical history helps your healthcare provider learn more about your symptoms and overall health. This often includes questions about your bowel habits and diet. You may also be asked how often you exercise and whether you take any medicines. Be sure to mention if any members of your family have had cancer or polyps of the colon.  Physical exam  During a physical exam, youll be asked to lie on an exam table. Youll then be examined for signs of swollen hemorrhoids and other problems. The exam takes just a few minutes. It is usually not painful:  · A visual exam is used to view the outer anal skin.  · A digital rectal exam is used to check for hemorrhoids or other problems in the anal canal. It is done using a lubricated gloved finger.  · An anoscopic exam is done using a special viewing tube called an anoscope. The scope helps your healthcare provider view the anal canal.  Grading hemorrhoids  Based on the physical exam, your [facility] provider may assign a grade to internal hemorrhoids. The grades are based on the severity of your symptoms:  · Grade I hemorrhoids do not protrude from the anus. They may bleed, but otherwise cause few symptoms.  · Grade II hemorrhoids protrude from the anus during bowel movements. They reduce back into the anal canal when straining stops.  · Grade III hemorrhoids protrude on their own or with straining. They do not reduce by themselves, but can be pushed back into place.  · Grade IV hemorrhoids protrude and cannot be reduced at all. They can also be painful and may need prompt treatment.  Pregnancy and hemorrhoids  Many women develop hemorrhoids during pregnancy and childbirth. This is likely caused by  pressure on the pelvis and by hormonal changes. In most cases, the hemorrhoids will eventually go away on their own. In the meantime, talk with your healthcare provider about ways to help relieve your symptoms.   Other anal problems  Below are common problems that can cause symptoms similar to hemorrhoids. Your healthcare provider can explain your treatment choices:  · A fissure is a small tear or crack in the lining of the anus. It can be caused by hard bowel movements, diarrhea, or inflammation in the rectal area. Fissures can bleed and cause painful bowel movements.  · An abscess is an infected gland in the anal canal. The infected area swells and often causes pain.  · A fistula is a pathway that may form when an anal abscess drains. The pathway may remain after the abscess is gone. Fistulas are not usually painful. But they can cause drainage where the pathway meets the skin.   Date Last Reviewed: 7/1/2016  © 1931-2292 [employer]. [address] [place] [address], [place] [address]. All rights reserved. This information is not intended as a substitute for professional medical care. Always follow your healthcare professional's instructions.

## 2020-07-31 NOTE — PROGRESS NOTES
Subjective:       Patient ID: Vijay Rodriguez Jr. is a 59 y.o. male.    Vitals:  temperature is 98 °F (36.7 °C). His blood pressure is 135/89 and his pulse is 83. His oxygen saturation is 98%.     Chief Complaint: Hemorrhoids    Pt c/o rectal pain for 3 days and is concerned that he may have a hemorrhoid.  Denies blood in stool, on toilet seat, or on toilet paper.  Pt reports that it hurts to walk.      Other  This is a new problem. The current episode started in the past 7 days. The problem has been gradually worsening. Pertinent negatives include no chest pain, chills, diaphoresis, fever, nausea or vomiting. He has tried position changes (warm bath, ) for the symptoms. The treatment provided no relief.       Constitution: Negative for appetite change, chills, sweating and fever.   Cardiovascular: Negative for chest pain.   Respiratory: Negative for shortness of breath.    Gastrointestinal: Positive for hemorrhoids. Negative for abdominal trauma, abdominal bloating, history of abdominal surgery, nausea, vomiting, constipation, diarrhea, dark colored stools and heartburn.   Genitourinary: Negative for dysuria and pelvic pain.   Musculoskeletal: Positive for pain. Negative for back pain.       Objective:      Physical Exam   Constitutional: He is oriented to person, place, and time. He appears well-developed. No distress.   HENT:   Head: Normocephalic and atraumatic.   Ears:   Right Ear: External ear normal.   Left Ear: External ear normal.   Eyes: Conjunctivae and lids are normal.   Neck: Trachea normal, normal range of motion and phonation normal. Neck supple.   Cardiovascular: Normal rate.   Pulmonary/Chest: Effort normal. No respiratory distress.   Abdominal: Soft. Normal appearance and bowel sounds are normal. He exhibits no distension. There is no abdominal tenderness.   Genitourinary: Rectum:      Tenderness present.           Musculoskeletal: Normal range of motion.   Neurological: He is alert and oriented  to person, place, and time. He has normal reflexes.   Skin: Skin is warm, dry, intact and not diaphoretic. Psychiatric: His speech is normal and behavior is normal. Judgment and thought content normal.   Nursing note and vitals reviewed.        Assessment:       1. Anal or rectal pain    2. Perirectal abscess        Plan:         Anal or rectal pain  -     HYDROcodone-acetaminophen (NORCO) 5-325 mg per tablet; Take 1 tablet by mouth every 6 (six) hours as needed for Pain.  Dispense: 15 tablet; Refill: 0  -     hydrocortisone (ANUSOL-HC) 25 mg suppository; Place 1 suppository (25 mg total) rectally 2 (two) times daily. for 10 days  Dispense: 20 suppository; Refill: 0    Perirectal abscess  -     cefTRIAXone injection 1 g  -     amoxicillin-clavulanate 875-125mg (AUGMENTIN) 875-125 mg per tablet; Take 1 tablet by mouth 2 (two) times daily. for 5 days  Dispense: 10 tablet; Refill: 0  -     Ambulatory referral/consult to Gastroenterology         Patient Instructions       Diagnosing Hemorrhoids    To diagnose hemorrhoids, your healthcare provider will rule out other problems and determine how bad your hemorrhoids are. After the evaluation, your healthcare provider will help you decide on a treatment plan thats best for you.  Medical history  A medical history helps your healthcare provider learn more about your symptoms and overall health. This often includes questions about your bowel habits and diet. You may also be asked how often you exercise and whether you take any medicines. Be sure to mention if any members of your family have had cancer or polyps of the colon.  Physical exam  During a physical exam, youll be asked to lie on an exam table. Youll then be examined for signs of swollen hemorrhoids and other problems. The exam takes just a few minutes. It is usually not painful:  · A visual exam is used to view the outer anal skin.  · A digital rectal exam is used to check for hemorrhoids or other problems in the  anal canal. It is done using a lubricated gloved finger.  · An anoscopic exam is done using a special viewing tube called an anoscope. The scope helps your healthcare provider view the anal canal.  Grading hemorrhoids  Based on the physical exam, your [facility] provider may assign a grade to internal hemorrhoids. The grades are based on the severity of your symptoms:  · Grade I hemorrhoids do not protrude from the anus. They may bleed, but otherwise cause few symptoms.  · Grade II hemorrhoids protrude from the anus during bowel movements. They reduce back into the anal canal when straining stops.  · Grade III hemorrhoids protrude on their own or with straining. They do not reduce by themselves, but can be pushed back into place.  · Grade IV hemorrhoids protrude and cannot be reduced at all. They can also be painful and may need prompt treatment.  Pregnancy and hemorrhoids  Many women develop hemorrhoids during pregnancy and childbirth. This is likely caused by pressure on the pelvis and by hormonal changes. In most cases, the hemorrhoids will eventually go away on their own. In the meantime, talk with your healthcare provider about ways to help relieve your symptoms.   Other anal problems  Below are common problems that can cause symptoms similar to hemorrhoids. Your healthcare provider can explain your treatment choices:  · A fissure is a small tear or crack in the lining of the anus. It can be caused by hard bowel movements, diarrhea, or inflammation in the rectal area. Fissures can bleed and cause painful bowel movements.  · An abscess is an infected gland in the anal canal. The infected area swells and often causes pain.  · A fistula is a pathway that may form when an anal abscess drains. The pathway may remain after the abscess is gone. Fistulas are not usually painful. But they can cause drainage where the pathway meets the skin.   Date Last Reviewed: 7/1/2016  © 2021-2183 [employer]. [address]  [place] [address]. All rights reserved. This information is not intended as a substitute for professional medical care. Always follow your healthcare professional's instructions.          Strong ER precautions given.  Go to ER if pain worsens.

## 2020-08-04 ENCOUNTER — CLINICAL SUPPORT (OUTPATIENT)
Dept: REHABILITATION | Facility: HOSPITAL | Age: 60
End: 2020-08-04
Attending: ORTHOPAEDIC SURGERY
Payer: COMMERCIAL

## 2020-08-04 DIAGNOSIS — S83.249A MEDIAL MENISCUS TEAR: ICD-10-CM

## 2020-08-04 DIAGNOSIS — M25.562 LEFT KNEE PAIN, UNSPECIFIED CHRONICITY: ICD-10-CM

## 2020-08-04 PROCEDURE — 97110 THERAPEUTIC EXERCISES: CPT

## 2020-08-04 PROCEDURE — 97161 PT EVAL LOW COMPLEX 20 MIN: CPT

## 2020-08-05 PROBLEM — M25.569 KNEE PAIN: Status: ACTIVE | Noted: 2020-08-05

## 2020-08-05 NOTE — PLAN OF CARE
[facility]  Physical Therapy Initial Evaluation    Date: 8/4/2020   Name: Vijay Rodriguez Jr.  Clinic Number: [id number]    Therapy Diagnosis:   Encounter Diagnoses   Name Primary?    Left knee pain, unspecified chronicity     Medial meniscus tear      Physician: Jose Luis Phillips MD    Physician Orders: PT Eval and Treat L knee pain  Medical Diagnosis from Referral: M25.562 (ICD-10-CM) - Left knee pain, unspecified chronicity  Evaluation Date: 8/4/2020  Authorization Period Expiration: 12/31/20  Plan of Care Expiration: 11/4/20  Visit # / Visits authorized: 1/ 20    Time In: 3:15  Time Out: 4:00  Total Appointment Time (timed & untimed codes): 45 minutes    Precautions: Standard    Subjective   Date of onset: 2 years ago initially 1 month ago returned  History of current condition - Vijay reports: he first had knee pain while climbing a ladder 2 years ago, pt reports pain subsided with conservative care. Pt reports pain returned 1 month ago while carrying a leaf blower. Pt reports he has increased pain twisting L or R, pt endorses popping in the knee but denies any locking. Pt would like to prolong surgery as long as possible, return to work without restrictions and be able to perform ADL's at [employer].      Medical History:   Past Medical History:   Diagnosis Date    Colon polyp     Diabetes mellitus type II     Hypertension        Surgical History:   Vijay Rodriguez Jr.  has a past surgical history that includes Prostate surgery (2007).    Medications:   Vijay has a current medication list which includes the following prescription(s): albuterol, amlodipine, amoxicillin, amoxicillin-clavulanate 875-125mg, atorvastatin, azelastine, benzonatate, celecoxib, cyclobenzaprine, epinephrine, fluticasone propionate, hydrocodone-acetaminophen, hydrocortisone, levothyroxine, metformin, montelukast, naproxen, nicotine, ranitidine, tadalafil, and vitamin d2.    Allergies:   Review of  patient's allergies indicates:   Allergen Reactions    Hymenoptera allergenic extract Anaphylaxis    Insects extract     Lisinopril Other (See Comments)     cough    Shellfish containing products Hives        Imaging, MRI studies: 7/7/20:    Impression:     Complex tear of the posterior horn medial meniscus with signal heterogeneity at the posterior root attachment suggesting component of tear extension.     Knee joint effusion.     Mild chondral degenerative change of the medial compartment     Partially ruptured popliteal cyst    Prior Therapy: PT in the past for wrist and shoulders  Social History: Pt lives with wife   Occupation:   Prior Level of Function: Pt independent with all ADLs, job responsibilities and participating in regular physical activity  Current Level of Function: Pt limited in ADLs, job responsibilities and unable to perform physical activity at [employer]    Pain:  Current 3/10, worst 9/10, best 3/10   Location: { left knee(s)   Description: Aching, Dull, Tight and Sharp  Aggravating Factors: Standing, Bending, Walking, Extension, Flexing and Getting out of bed/chair  Easing Factors: pain medication, ice, rest and elevation    Pts goals: 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   Passive 3-0-140 3-0-125 P! W/ overpressure     Lower Extremity Strength  Right LE  Left LE    Knee extension: 5/5 Knee extension: 4/5   Knee flexion: 4/5 Knee flexion: 4-/5   Hip flexion: 5/5 Hip flexion: 5/5   Hip extension:  4-/5 Hip extension: 3+/5   Hip abduction: 3+/5 Hip abduction: 3+/5   Hip adduction: 4/5 Hip adduction 4/5   Ankle dorsiflexion: 5/5 Ankle dorsiflexion: 5/5   Ankle plantarflexion: 5/5 Ankle plantarflexion: 5/5     Special Tests:   Left Right   Valgus Stress Test - -   Varus Stress test - -   Lachman's test - -   Posterior Lachman - -   Yamila's Test NT NT  "  Apley's Compression NT NT   Patellar Grind Test + +     Joint Mobility: Patellar mobility restricted on L vs R, improves with mobilization    Palpation: Pt TTP to medial/posterior joint line on L    Sensation: WNL    Flexibility:      Lori's test: R = + ; L = +   Abram test: R = + ; L = +    Edema: none bilat    Limitation/Restriction for FOTO Knee Survey    Therapist reviewed FOTO scores for Vijay Rodriguez Jr. on 8/4/2020.   FOTO documents entered into [employer] - see Media section.           TREATMENT   Treatment Time In: 2:58  Treatment Time Out: 3:15  Total Treatment time (time-based codes) separate from Evaluation: 17 minutes    Vijay received therapeutic exercises to develop strength, endurance, ROM, flexibility, posture and core stabilization for 15 minutes including:    SAQ 2x20:03"  Quad set with heel prop 5 sec hold x20  SLR 2x10  Bridge w/ GTB 3x10  Clam w/ GTB 2x10    Vijay received the following manual therapy techniques: Joint mobilizations were applied to the: L knee for 2 minutes, including:    Patellar mobs in all direction  Fat pad mob    Home Exercises and Patient Education Provided    Education provided:   - activity modification, pathoanatomy of meniscus, POC, prognosis    Written Home Exercises Provided: yes.  Exercises were reviewed and Mayur was able to demonstrate them prior to the end of the session.  Mauyr demonstrated good  understanding of the education provided.     See EMR under Patient Instructions for exercisesprovided 8/4/2020.    Assessment   Vijay is a 60 y.o. male referred to outpatient Physical Therapy with a medical diagnosis of L knee pain. Pt presents with decreased strength, decreased ROM, decreased flexibility, and increased pain. Due to impairments, pt is unable to perform ADL's, job responsibilities or participate in physical activity at [facility].    Pt prognosis is Good.   Pt will benefit from skilled outpatient Physical Therapy to address the deficits stated above " and in the chart below, provide pt/family education, and to maximize pt's level of independence.     Plan of care discussed with patient: Yes  Pt's spiritual, cultural and educational needs considered and patient is agreeable to the plan of care and goals as stated below:     Anticipated Barriers for therapy: n/a    Medical Necessity is demonstrated by the following  History  Co-morbidities and personal factors that may impact the plan of care Co-morbidities:   See medical chart    Personal Factors:   no deficits     low   Examination  Body Structures and Functions, activity limitations and participation restrictions that may impact the plan of care Body Regions:   lower extremities    Body Systems:    ROM  strength  balance  gait    Participation Restrictions:   covid-19    Activity limitations:   Learning and applying knowledge  no deficits    General Tasks and Commands  no deficits    Communication  no deficits    Mobility  lifting and carrying objects  moving around using equipment (WC)  driving (bike, car, motorcycle)    Self care  looking after one's health    Domestic Life  no deficits    Interactions/Relationships  no deficits    Life Areas  no deficits    Community and Social Life  no deficits         low   Clinical Presentation stable and uncomplicated low   Decision Making/ Complexity Score: low     Goals:  Short Term Goals: 3 weeks   1. Pt will be independent with HEP and report compliance at least 4 days/week  2. Pt will be able to walk1 mile on level surface reporting less than 2/10 pain  3. Pt will be able to perform 3x15 SLR reporting no pain with full terminal knee extension    Long Term Goals: 12 weeks   1. Pt will be able to perform all job duties, making modifications when necessary reporting less than 2/10 pain  2. Pt will demonstrate knee/hip MMT of at least 4+/5 to demonstrate sufficient LE strength/stability for functional activities  3. Pt will be independent with progressed strengthening  routine and report compliance at least 4 days/week to maintain strength gains    Plan   Plan of care Certification: 8/4/2020 to 11/4/20.    Outpatient Physical Therapy 2 times weekly for 12 weeks to include the following interventions: Cervical/Lumbar Traction, Electrical Stimulation TENS/NMES, Gait Training, Manual Therapy, Moist Heat/ Ice, Neuromuscular Re-ed, Patient Education, Self Care, Therapeutic Activites, Therapeutic Exercise and Dry Needling.     Shakeel Alvares, PT

## 2020-09-03 ENCOUNTER — CLINICAL SUPPORT (OUTPATIENT)
Dept: REHABILITATION | Facility: HOSPITAL | Age: 60
End: 2020-09-03
Attending: ORTHOPAEDIC SURGERY
Payer: COMMERCIAL

## 2020-09-03 DIAGNOSIS — M25.562 LEFT KNEE PAIN, UNSPECIFIED CHRONICITY: ICD-10-CM

## 2020-09-03 PROCEDURE — 97110 THERAPEUTIC EXERCISES: CPT

## 2020-09-05 NOTE — PROGRESS NOTES
"  Physical Therapy Daily Treatment Note     Name: Vijay Rodriguez Jr.  Clinic Number: [id number]    Therapy Diagnosis:   Encounter Diagnosis   Name Primary?    Left knee pain, unspecified chronicity      Physician: Jose Luis Phillips MD    Visit Date: 9/3/2020    Physician Orders: PT Eval and Treat L knee pain  Medical Diagnosis from Referral: M25.562 (ICD-10-CM) - Left knee pain, unspecified chronicity  Evaluation Date: 8/4/2020  Authorization Period Expiration: 12/31/20  Plan of Care Expiration: 11/4/20  Visit # / Visits authorized: 1/ 20     Time In: 4:45  Time Out: 5:30  Total Billable Time: 45 minutes    Precautions: Standard and Diabetes    Subjective     Pt reports: he has been feeling better since eval. Has been more aware of mechanics while at work, rode bike twice without increase in pain or swelling.  He was compliant with home exercise program.  Response to previous treatment/Functional change: improved pain/ tolerance to work/ ADL's    Pain: 2/10  Location: left knee      Objective     Range of Motion:   Knee Right Left   Active 3-0-140 3-0-130   Passive 3-0-140 3-0-130      Vijay received therapeutic exercises to develop strength, endurance, ROM, flexibility, posture and core stabilization for 45 minutes including:     Bike for 7 min for tissue extensibility/ pain modulation  SAQ 2x20:03"  Quad set with heel prop 5 sec hold x20  SLR 2x10 3 sec hold  Bridge w/ GTB 3x10  Clam w/ GTB 2x10  GTB side steps 2 laps  GTB monster walk 2 laps  Hip hinge w/ GTB at knees 2x20     Home Exercises Provided and Patient Education Provided     Education provided:   - activity modification, POC, prognosis  -Progress toward goals     Written Home Exercises Provided: yes.  Exercises were reviewed and Vijay was able to demonstrate them prior to the end of the session.  Vijay demonstrated good  understanding of the education provided.     See EMR under Patient Instructions for exercises provided " 9/3/2020.    Assessment   Vijay participated in today's treatment session without symptom provocation or adverse effects. Pt demonstrated good quad strength and improved pain free ROM. Pt demonstrates weakness to glutes, benefits from cuing for glute activation with functional strengthening. Will continue to progress as tolerated.     Pt prognosis is Excellent.     Pt will continue to benefit from skilled outpatient physical therapy to address the deficits listed in the problem list box on initial evaluation, provide pt/family education and to maximize pt's level of independence in the home and community environment.     Pt's spiritual, cultural and educational needs considered and pt agreeable to plan of care and goals.     Anticipated barriers to physical therapy: n/a    Goals:     Short Term Goals: 3 weeks   1. Pt will be independent with HEP and report compliance at least 4 days/week  2. Pt will be able to walk1 mile on level surface reporting less than 2/10 pain  3. Pt will be able to perform 3x15 SLR reporting no pain with full terminal knee extension     Long Term Goals: 12 weeks   1. Pt will be able to perform all job duties, making modifications when necessary reporting less than 2/10 pain  2. Pt will demonstrate knee/hip MMT of at least 4+/5 to demonstrate sufficient LE strength/stability for functional activities  3. Pt will be independent with progressed strengthening routine and report compliance at least 4 days/week to maintain strength gains      Plan       Continue with established Plan of Care towards PT goals.

## 2020-09-21 ENCOUNTER — CLINICAL SUPPORT (OUTPATIENT)
Dept: REHABILITATION | Facility: HOSPITAL | Age: 60
End: 2020-09-21
Attending: ORTHOPAEDIC SURGERY
Payer: COMMERCIAL

## 2020-09-21 ENCOUNTER — TELEPHONE (OUTPATIENT)
Dept: FAMILY MEDICINE | Facility: CLINIC | Age: 60
End: 2020-09-21

## 2020-09-21 ENCOUNTER — TELEPHONE (OUTPATIENT)
Dept: SPORTS MEDICINE | Facility: CLINIC | Age: 60
End: 2020-09-21

## 2020-09-21 DIAGNOSIS — M25.562 LEFT KNEE PAIN, UNSPECIFIED CHRONICITY: ICD-10-CM

## 2020-09-21 PROCEDURE — 97110 THERAPEUTIC EXERCISES: CPT | Mod: CQ

## 2020-09-21 NOTE — TELEPHONE ENCOUNTER
Pt not having surgery and would like to stick with Physical Therapy in the meantime.    Called pt to discuss pcp clearance, pt had already decided to cancel surgery so now does not need to see pcp at this time.

## 2020-09-21 NOTE — TELEPHONE ENCOUNTER
Good afternoon,    Unfortunately we are unable to accommodate this patient for a pre-op. Dr. Cruz is booked up this week. Dr. Prado states we need to have a week or more notice before scheduled surgery. Are NP have openings on the 23rd. Stephanie fritz has openings tomorrow if he would like to schedule with one of their MDs. Please let us know. Thank you and have a good day.

## 2020-09-21 NOTE — PROGRESS NOTES
"  Physical Therapy Daily Treatment Note     Name: Vijay Rodriguez Jr.  Clinic Number: [id number]    Therapy Diagnosis:   Encounter Diagnosis   Name Primary?    Left knee pain, unspecified chronicity      Physician: Jose Luis Phillips MD    Visit Date: 9/21/2020    Physician Orders: PT Eval and Treat L knee pain  Medical Diagnosis from Referral: M25.562 (ICD-10-CM) - Left knee pain, unspecified chronicity  Evaluation Date: 8/4/2020  Authorization Period Expiration: 12/31/20  Plan of Care Expiration: 11/4/20  Visit # / Visits authorized: 3/ 20     Time In: 4:45  Time Out: 5:30  Total Billable Time: 45 minutes    Precautions: Standard and Diabetes    Subjective     Pt reports: feeling better  He was compliant with home exercise program.  Response to previous treatment/Functional change: improved pain/ tolerance to work/ ADL's    Pain: 2/10  Location: left knee      Objective     Range of Motion:   Knee Right Left   Active 3-0-140 3-0-130   Passive 3-0-140 3-0-130      Vijay received therapeutic exercises to develop strength, endurance, ROM, flexibility, posture and core stabilization for 45 minutes including:     Bike for 8 min for tissue extensibility/ pain modulation  SAQ 2x20:03"  Quad set with heel prop 5 sec hold x20  SLR 2x10 3 sec hold  Bridge w/ GTB 3x10 3" hold  Clam w/ GTB 3x10 3" hold  GTB side steps 2 laps  GTB monster walk 2 laps  Hip hinge w/ GTB at knees 2x20     Home Exercises Provided and Patient Education Provided     Education provided:   - activity modification, POC, prognosis  -Progress toward goals     Written Home Exercises Provided: yes.  Exercises were reviewed and Vijay was able to demonstrate them prior to the end of the session.  Vijay demonstrated good  understanding of the education provided.     See EMR under Patient Instructions for exercises provided 9/3/2020.    Assessment   Vijay gil all nida well today w/o adverse reaction. Some knee stiffness noted after prolong squatting " w/ crabwalks. Hip fatigue after session.    Pt prognosis is Excellent.     Pt will continue to benefit from skilled outpatient physical therapy to address the deficits listed in the problem list box on initial evaluation, provide pt/family education and to maximize pt's level of independence in the home and community environment.     Pt's spiritual, cultural and educational needs considered and pt agreeable to plan of care and goals.     Anticipated barriers to physical therapy: n/a    Goals:     Short Term Goals: 3 weeks   1. Pt will be independent with HEP and report compliance at least 4 days/week  2. Pt will be able to walk1 mile on level surface reporting less than 2/10 pain  3. Pt will be able to perform 3x15 SLR reporting no pain with full terminal knee extension     Long Term Goals: 12 weeks   1. Pt will be able to perform all job duties, making modifications when necessary reporting less than 2/10 pain  2. Pt will demonstrate knee/hip MMT of at least 4+/5 to demonstrate sufficient LE strength/stability for functional activities  3. Pt will be independent with progressed strengthening routine and report compliance at least 4 days/week to maintain strength gains      Plan       Continue with established Plan of Care towards PT goals.

## 2020-09-21 NOTE — TELEPHONE ENCOUNTER
----- Message from Jose Alfredo Michaud MA sent at 9/21/2020  2:11 PM CDT -----  Regarding: Pre-op Clearance  Good Afternoon,    The attached patient is shared by you and Dr. Jose Luis Phillips    The patient is scheduled for knee surgery on 9/28/20.     Medical clearance is indicated prior to this. Would you be able to help the patient in that regard? The anesthesia team will require documentation in the chart regarding her clearance status and perioperative medical recommendations. The patient is scheduled to pre-op with us on 9/22- tomorrow and we would like to have clearance completed prior to this appointment      Thank you for your help & let me know if I can assist in any way!    Thanks,  Alfred Michaud, Medical Assistant  [facility]

## 2020-10-14 ENCOUNTER — TELEPHONE (OUTPATIENT)
Dept: OTOLARYNGOLOGY | Facility: CLINIC | Age: 60
End: 2020-10-14

## 2020-10-14 RX ORDER — AZELASTINE 1 MG/ML
1 SPRAY, METERED NASAL 2 TIMES DAILY
Qty: 30 ML | Refills: 3 | Status: SHIPPED | OUTPATIENT
Start: 2020-10-14 | End: 2021-08-27

## 2020-10-14 NOTE — Clinical Note
Three or more attempt has been made to schedule patient sleep study and was unsuccessful. Message to ordering provider. Please advise thanks.

## 2020-10-14 NOTE — LETTER
October 14, 2020    Vijay Rodriguez Jr.  [address] [facility] [id number]             [facility] [place] - Otolaryngology  [address]   [place] [address]  Phone: [phone] Dear Ms Michael Yepez Jr. your provider Dr Karen Oscar MD has placed a referral to [facility] for Home Sleep Study. We have made several attempts to reach you via phone, but was unsuccessful. Please give us a call at [phone] to schedule, or to inform your ordering provider if you are no longer interested in performing requested study. If you have additional questions or concern regarding ordered test you may discuss with your ordering provider. Thank you for choosing [facility]. We look forward to seeing you soon.      Sincerely,      Your [facility]

## 2020-10-14 NOTE — TELEPHONE ENCOUNTER
Left message for the patient to call the office. Would like to notify him that the Sleep Medicine office has been trying to contact him regarding scheduled his test. Please see previous message attached from Sleep Medicine MA.

## 2020-10-14 NOTE — TELEPHONE ENCOUNTER
Called patient x 3 to schedule HST no answer LVM, sent letter via portal, address on file, and message to ordering provider staff to assist with reaching patient for scheduling.    Please assist Sleep Lab reach patient to schedule sleep study ordered by Dr. Oscar Thanks

## 2021-01-05 ENCOUNTER — PATIENT MESSAGE (OUTPATIENT)
Dept: ADMINISTRATIVE | Facility: HOSPITAL | Age: 61
End: 2021-01-05

## 2021-03-08 ENCOUNTER — OFFICE VISIT (OUTPATIENT)
Dept: FAMILY MEDICINE | Facility: CLINIC | Age: 61
End: 2021-03-08
Payer: COMMERCIAL

## 2021-03-08 VITALS
WEIGHT: 215.19 LBS | BODY MASS INDEX: 33.78 KG/M2 | SYSTOLIC BLOOD PRESSURE: 98 MMHG | DIASTOLIC BLOOD PRESSURE: 60 MMHG | OXYGEN SATURATION: 99 % | HEIGHT: 67 IN | HEART RATE: 74 BPM | RESPIRATION RATE: 18 BRPM

## 2021-03-08 DIAGNOSIS — Z72.0 TOBACCO USE: ICD-10-CM

## 2021-03-08 DIAGNOSIS — E78.2 MIXED HYPERLIPIDEMIA: ICD-10-CM

## 2021-03-08 DIAGNOSIS — E66.9 OBESITY (BMI 30-39.9): ICD-10-CM

## 2021-03-08 DIAGNOSIS — E11.9 CONTROLLED TYPE 2 DIABETES MELLITUS WITHOUT COMPLICATION, WITHOUT LONG-TERM CURRENT USE OF INSULIN: Primary | ICD-10-CM

## 2021-03-08 DIAGNOSIS — R76.8 HEPATITIS C ANTIBODY POSITIVE IN BLOOD: ICD-10-CM

## 2021-03-08 DIAGNOSIS — I10 ESSENTIAL HYPERTENSION: ICD-10-CM

## 2021-03-08 DIAGNOSIS — Z23 ENCOUNTER FOR ADMINISTRATION OF VACCINE: ICD-10-CM

## 2021-03-08 DIAGNOSIS — Z12.11 COLON CANCER SCREENING: ICD-10-CM

## 2021-03-08 DIAGNOSIS — Z00.00 ANNUAL PHYSICAL EXAM: ICD-10-CM

## 2021-03-08 DIAGNOSIS — E03.8 OTHER SPECIFIED HYPOTHYROIDISM: ICD-10-CM

## 2021-03-08 PROCEDURE — 99999 PR PBB SHADOW E&M-EST. PATIENT-LVL IV: ICD-10-PCS | Mod: PBBFAC,,, | Performed by: FAMILY MEDICINE

## 2021-03-08 PROCEDURE — 90686 IIV4 VACC NO PRSV 0.5 ML IM: CPT | Mod: S$GLB,,, | Performed by: FAMILY MEDICINE

## 2021-03-08 PROCEDURE — 3008F BODY MASS INDEX DOCD: CPT | Mod: CPTII,S$GLB,, | Performed by: FAMILY MEDICINE

## 2021-03-08 PROCEDURE — 1126F PR PAIN SEVERITY QUANTIFIED, NO PAIN PRESENT: ICD-10-PCS | Mod: S$GLB,,, | Performed by: FAMILY MEDICINE

## 2021-03-08 PROCEDURE — 99999 PR PBB SHADOW E&M-EST. PATIENT-LVL IV: CPT | Mod: PBBFAC,,, | Performed by: FAMILY MEDICINE

## 2021-03-08 PROCEDURE — 90686 FLU VACCINE (QUAD) GREATER THAN OR EQUAL TO 3YO PRESERVATIVE FREE IM: ICD-10-PCS | Mod: S$GLB,,, | Performed by: FAMILY MEDICINE

## 2021-03-08 PROCEDURE — 90471 IMMUNIZATION ADMIN: CPT | Mod: S$GLB,,, | Performed by: FAMILY MEDICINE

## 2021-03-08 PROCEDURE — 3008F PR BODY MASS INDEX (BMI) DOCUMENTED: ICD-10-PCS | Mod: CPTII,S$GLB,, | Performed by: FAMILY MEDICINE

## 2021-03-08 PROCEDURE — 90471 FLU VACCINE (QUAD) GREATER THAN OR EQUAL TO 3YO PRESERVATIVE FREE IM: ICD-10-PCS | Mod: S$GLB,,, | Performed by: FAMILY MEDICINE

## 2021-03-08 PROCEDURE — 99214 PR OFFICE/OUTPT VISIT, EST, LEVL IV, 30-39 MIN: ICD-10-PCS | Mod: 25,S$GLB,, | Performed by: FAMILY MEDICINE

## 2021-03-08 PROCEDURE — 3052F HG A1C>EQUAL 8.0%<EQUAL 9.0%: CPT | Mod: CPTII,S$GLB,, | Performed by: FAMILY MEDICINE

## 2021-03-08 PROCEDURE — 3074F SYST BP LT 130 MM HG: CPT | Mod: CPTII,S$GLB,, | Performed by: FAMILY MEDICINE

## 2021-03-08 PROCEDURE — 3078F PR MOST RECENT DIASTOLIC BLOOD PRESSURE < 80 MM HG: ICD-10-PCS | Mod: CPTII,S$GLB,, | Performed by: FAMILY MEDICINE

## 2021-03-08 PROCEDURE — 3074F PR MOST RECENT SYSTOLIC BLOOD PRESSURE < 130 MM HG: ICD-10-PCS | Mod: CPTII,S$GLB,, | Performed by: FAMILY MEDICINE

## 2021-03-08 PROCEDURE — 1126F AMNT PAIN NOTED NONE PRSNT: CPT | Mod: S$GLB,,, | Performed by: FAMILY MEDICINE

## 2021-03-08 PROCEDURE — 99214 OFFICE O/P EST MOD 30 MIN: CPT | Mod: 25,S$GLB,, | Performed by: FAMILY MEDICINE

## 2021-03-08 PROCEDURE — 3078F DIAST BP <80 MM HG: CPT | Mod: CPTII,S$GLB,, | Performed by: FAMILY MEDICINE

## 2021-03-08 PROCEDURE — 3052F PR MOST RECENT HEMOGLOBIN A1C LEVEL 8.0 - < 9.0%: ICD-10-PCS | Mod: CPTII,S$GLB,, | Performed by: FAMILY MEDICINE

## 2021-03-08 RX ORDER — AMLODIPINE BESYLATE 10 MG/1
10 TABLET ORAL DAILY
Qty: 60 TABLET | Refills: 0 | Status: SHIPPED | OUTPATIENT
Start: 2021-03-08 | End: 2021-06-18

## 2021-03-08 RX ORDER — LEVOTHYROXINE SODIUM 50 UG/1
50 TABLET ORAL
Qty: 60 TABLET | Refills: 0 | Status: SHIPPED | OUTPATIENT
Start: 2021-03-08 | End: 2021-06-26 | Stop reason: SDUPTHER

## 2021-03-08 RX ORDER — METFORMIN HYDROCHLORIDE 500 MG/1
1000 TABLET, EXTENDED RELEASE ORAL DAILY
Qty: 180 TABLET | Refills: 0 | Status: SHIPPED | OUTPATIENT
Start: 2021-03-08 | End: 2021-08-27 | Stop reason: SDUPTHER

## 2021-03-28 LAB — NONINV COLON CA DNA+OCC BLD SCRN STL QL: NEGATIVE

## 2021-04-06 ENCOUNTER — PATIENT MESSAGE (OUTPATIENT)
Dept: ADMINISTRATIVE | Facility: HOSPITAL | Age: 61
End: 2021-04-06

## 2021-04-10 LAB
ALBUMIN SERPL-MCNC: 4.1 G/DL (ref 3.6–5.1)
ALBUMIN/CREAT UR: 2 MCG/MG CREAT
ALBUMIN/GLOB SERPL: 1.5 (CALC) (ref 1–2.5)
ALP SERPL-CCNC: 57 U/L (ref 35–144)
ALT SERPL-CCNC: 33 U/L (ref 9–46)
AST SERPL-CCNC: 27 U/L (ref 10–35)
BILIRUB SERPL-MCNC: 0.4 MG/DL (ref 0.2–1.2)
BUN SERPL-MCNC: 10 MG/DL (ref 7–25)
BUN/CREAT SERPL: ABNORMAL (CALC) (ref 6–22)
CALCIUM SERPL-MCNC: 9 MG/DL (ref 8.6–10.3)
CHLORIDE SERPL-SCNC: 109 MMOL/L (ref 98–110)
CHOLEST SERPL-MCNC: 94 MG/DL
CHOLEST/HDLC SERPL: 2.7 (CALC)
CO2 SERPL-SCNC: 26 MMOL/L (ref 20–32)
CREAT SERPL-MCNC: 0.95 MG/DL (ref 0.7–1.33)
CREAT UR-MCNC: 173 MG/DL (ref 20–320)
ERYTHROCYTE [DISTWIDTH] IN BLOOD BY AUTOMATED COUNT: 13 % (ref 11–15)
GFRSERPLBLD MDRD-ARVRAT: 87 ML/MIN/1.73M2
GLOBULIN SER CALC-MCNC: 2.7 G/DL (CALC) (ref 1.9–3.7)
GLUCOSE SERPL-MCNC: 153 MG/DL (ref 65–99)
HBA1C MFR BLD: 7.8 % OF TOTAL HGB
HCT VFR BLD AUTO: 42 % (ref 38.5–50)
HDLC SERPL-MCNC: 35 MG/DL
HGB BLD-MCNC: 13.9 G/DL (ref 13.2–17.1)
LDLC SERPL CALC-MCNC: 42 MG/DL (CALC)
MCH RBC QN AUTO: 30.2 PG (ref 27–33)
MCHC RBC AUTO-ENTMCNC: 33.1 G/DL (ref 32–36)
MCV RBC AUTO: 91.3 FL (ref 80–100)
MICROALBUMIN UR-MCNC: 0.4 MG/DL
NONHDLC SERPL-MCNC: 59 MG/DL (CALC)
PLATELET # BLD AUTO: 363 THOUSAND/UL (ref 140–400)
PMV BLD REES-ECKER: 8.8 FL (ref 7.5–12.5)
POTASSIUM SERPL-SCNC: 4.6 MMOL/L (ref 3.5–5.3)
PROT SERPL-MCNC: 6.8 G/DL (ref 6.1–8.1)
PSA SERPL-MCNC: <0.1 NG/ML
RBC # BLD AUTO: 4.6 MILLION/UL (ref 4.2–5.8)
SODIUM SERPL-SCNC: 142 MMOL/L (ref 135–146)
T3FREE SERPL-MCNC: 2.7 PG/ML (ref 2.3–4.2)
T4 FREE SERPL-MCNC: 1.1 NG/DL (ref 0.8–1.8)
TRIGL SERPL-MCNC: 89 MG/DL
TSH SERPL-ACNC: 2.46 MIU/L (ref 0.4–4.5)
WBC # BLD AUTO: 8.2 THOUSAND/UL (ref 3.8–10.8)

## 2021-04-12 ENCOUNTER — PATIENT MESSAGE (OUTPATIENT)
Dept: RESEARCH | Facility: HOSPITAL | Age: 61
End: 2021-04-12

## 2021-05-03 ENCOUNTER — PATIENT MESSAGE (OUTPATIENT)
Dept: FAMILY MEDICINE | Facility: CLINIC | Age: 61
End: 2021-05-03

## 2021-05-03 RX ORDER — TADALAFIL 20 MG/1
20 TABLET ORAL DAILY
Qty: 30 TABLET | Refills: 0 | Status: SHIPPED | OUTPATIENT
Start: 2021-05-03 | End: 2022-03-04 | Stop reason: SDUPTHER

## 2021-06-26 DIAGNOSIS — I10 ESSENTIAL HYPERTENSION: ICD-10-CM

## 2021-06-26 DIAGNOSIS — E03.8 OTHER SPECIFIED HYPOTHYROIDISM: ICD-10-CM

## 2021-06-26 DIAGNOSIS — E78.5 HYPERLIPIDEMIA, UNSPECIFIED HYPERLIPIDEMIA TYPE: ICD-10-CM

## 2021-06-29 RX ORDER — ATORVASTATIN CALCIUM 20 MG/1
20 TABLET, FILM COATED ORAL DAILY
Qty: 90 TABLET | Refills: 3 | Status: SHIPPED | OUTPATIENT
Start: 2021-06-29 | End: 2022-03-17 | Stop reason: SDUPTHER

## 2021-06-29 RX ORDER — AMLODIPINE BESYLATE 10 MG/1
10 TABLET ORAL DAILY
Qty: 90 TABLET | Refills: 1 | Status: SHIPPED | OUTPATIENT
Start: 2021-06-29 | End: 2021-12-03 | Stop reason: SDUPTHER

## 2021-06-29 RX ORDER — LEVOTHYROXINE SODIUM 50 UG/1
50 TABLET ORAL
Qty: 90 TABLET | Refills: 1 | Status: SHIPPED | OUTPATIENT
Start: 2021-06-29 | End: 2022-03-17 | Stop reason: SDUPTHER

## 2021-07-06 ENCOUNTER — PATIENT MESSAGE (OUTPATIENT)
Dept: ADMINISTRATIVE | Facility: HOSPITAL | Age: 61
End: 2021-07-06

## 2021-07-21 DIAGNOSIS — E11.9 TYPE 2 DIABETES MELLITUS WITHOUT COMPLICATION, UNSPECIFIED WHETHER LONG TERM INSULIN USE: ICD-10-CM

## 2021-08-04 ENCOUNTER — PATIENT MESSAGE (OUTPATIENT)
Dept: ADMINISTRATIVE | Facility: HOSPITAL | Age: 61
End: 2021-08-04

## 2021-08-27 ENCOUNTER — OFFICE VISIT (OUTPATIENT)
Dept: FAMILY MEDICINE | Facility: CLINIC | Age: 61
End: 2021-08-27
Payer: COMMERCIAL

## 2021-08-27 VITALS
BODY MASS INDEX: 32.98 KG/M2 | SYSTOLIC BLOOD PRESSURE: 120 MMHG | TEMPERATURE: 98 F | HEART RATE: 63 BPM | DIASTOLIC BLOOD PRESSURE: 66 MMHG | WEIGHT: 210.56 LBS | OXYGEN SATURATION: 95 %

## 2021-08-27 DIAGNOSIS — I10 ESSENTIAL HYPERTENSION: ICD-10-CM

## 2021-08-27 DIAGNOSIS — R68.2 DRY MOUTH: ICD-10-CM

## 2021-08-27 DIAGNOSIS — Z00.00 ANNUAL PHYSICAL EXAM: Primary | ICD-10-CM

## 2021-08-27 DIAGNOSIS — R76.8 HEPATITIS C ANTIBODY POSITIVE IN BLOOD: ICD-10-CM

## 2021-08-27 DIAGNOSIS — E11.9 CONTROLLED TYPE 2 DIABETES MELLITUS WITHOUT COMPLICATION, WITHOUT LONG-TERM CURRENT USE OF INSULIN: ICD-10-CM

## 2021-08-27 DIAGNOSIS — E66.9 OBESITY (BMI 30-39.9): ICD-10-CM

## 2021-08-27 DIAGNOSIS — Z72.0 TOBACCO USE: ICD-10-CM

## 2021-08-27 DIAGNOSIS — E03.8 OTHER SPECIFIED HYPOTHYROIDISM: ICD-10-CM

## 2021-08-27 DIAGNOSIS — E78.2 MIXED HYPERLIPIDEMIA: ICD-10-CM

## 2021-08-27 PROCEDURE — 1126F AMNT PAIN NOTED NONE PRSNT: CPT | Mod: CPTII,S$GLB,, | Performed by: FAMILY MEDICINE

## 2021-08-27 PROCEDURE — 3078F PR MOST RECENT DIASTOLIC BLOOD PRESSURE < 80 MM HG: ICD-10-PCS | Mod: CPTII,S$GLB,, | Performed by: FAMILY MEDICINE

## 2021-08-27 PROCEDURE — 3008F BODY MASS INDEX DOCD: CPT | Mod: CPTII,S$GLB,, | Performed by: FAMILY MEDICINE

## 2021-08-27 PROCEDURE — 1159F MED LIST DOCD IN RCRD: CPT | Mod: CPTII,S$GLB,, | Performed by: FAMILY MEDICINE

## 2021-08-27 PROCEDURE — 3051F PR MOST RECENT HEMOGLOBIN A1C LEVEL 7.0 - < 8.0%: ICD-10-PCS | Mod: CPTII,S$GLB,, | Performed by: FAMILY MEDICINE

## 2021-08-27 PROCEDURE — 1160F RVW MEDS BY RX/DR IN RCRD: CPT | Mod: CPTII,S$GLB,, | Performed by: FAMILY MEDICINE

## 2021-08-27 PROCEDURE — 1160F PR REVIEW ALL MEDS BY PRESCRIBER/CLIN PHARMACIST DOCUMENTED: ICD-10-PCS | Mod: CPTII,S$GLB,, | Performed by: FAMILY MEDICINE

## 2021-08-27 PROCEDURE — 3078F DIAST BP <80 MM HG: CPT | Mod: CPTII,S$GLB,, | Performed by: FAMILY MEDICINE

## 2021-08-27 PROCEDURE — 3051F HG A1C>EQUAL 7.0%<8.0%: CPT | Mod: CPTII,S$GLB,, | Performed by: FAMILY MEDICINE

## 2021-08-27 PROCEDURE — 99999 PR PBB SHADOW E&M-EST. PATIENT-LVL IV: ICD-10-PCS | Mod: PBBFAC,,, | Performed by: FAMILY MEDICINE

## 2021-08-27 PROCEDURE — 1126F PR PAIN SEVERITY QUANTIFIED, NO PAIN PRESENT: ICD-10-PCS | Mod: CPTII,S$GLB,, | Performed by: FAMILY MEDICINE

## 2021-08-27 PROCEDURE — 3074F PR MOST RECENT SYSTOLIC BLOOD PRESSURE < 130 MM HG: ICD-10-PCS | Mod: CPTII,S$GLB,, | Performed by: FAMILY MEDICINE

## 2021-08-27 PROCEDURE — 99396 PR PREVENTIVE VISIT,EST,40-64: ICD-10-PCS | Mod: S$GLB,,, | Performed by: FAMILY MEDICINE

## 2021-08-27 PROCEDURE — 99396 PREV VISIT EST AGE 40-64: CPT | Mod: S$GLB,,, | Performed by: FAMILY MEDICINE

## 2021-08-27 PROCEDURE — 99999 PR PBB SHADOW E&M-EST. PATIENT-LVL IV: CPT | Mod: PBBFAC,,, | Performed by: FAMILY MEDICINE

## 2021-08-27 PROCEDURE — 1159F PR MEDICATION LIST DOCUMENTED IN MEDICAL RECORD: ICD-10-PCS | Mod: CPTII,S$GLB,, | Performed by: FAMILY MEDICINE

## 2021-08-27 PROCEDURE — 3074F SYST BP LT 130 MM HG: CPT | Mod: CPTII,S$GLB,, | Performed by: FAMILY MEDICINE

## 2021-08-27 PROCEDURE — 3008F PR BODY MASS INDEX (BMI) DOCUMENTED: ICD-10-PCS | Mod: CPTII,S$GLB,, | Performed by: FAMILY MEDICINE

## 2021-08-27 RX ORDER — GLIPIZIDE 10 MG/1
10 TABLET ORAL
Qty: 180 TABLET | Refills: 1 | Status: SHIPPED | OUTPATIENT
Start: 2021-08-27 | End: 2022-03-17 | Stop reason: SDUPTHER

## 2021-08-27 RX ORDER — METFORMIN HYDROCHLORIDE 500 MG/1
1000 TABLET, EXTENDED RELEASE ORAL DAILY
Qty: 180 TABLET | Refills: 1 | Status: SHIPPED | OUTPATIENT
Start: 2021-08-27 | End: 2021-12-03 | Stop reason: SDUPTHER

## 2021-09-14 ENCOUNTER — OFFICE VISIT (OUTPATIENT)
Dept: SURGERY | Facility: CLINIC | Age: 61
End: 2021-09-14
Payer: COMMERCIAL

## 2021-09-14 VITALS
HEART RATE: 95 BPM | HEIGHT: 65 IN | SYSTOLIC BLOOD PRESSURE: 158 MMHG | BODY MASS INDEX: 34.27 KG/M2 | WEIGHT: 205.69 LBS | DIASTOLIC BLOOD PRESSURE: 91 MMHG

## 2021-09-14 DIAGNOSIS — K62.89 RECTAL PAIN: Primary | ICD-10-CM

## 2021-09-14 PROCEDURE — 99203 PR OFFICE/OUTPT VISIT, NEW, LEVL III, 30-44 MIN: ICD-10-PCS | Mod: S$GLB,,, | Performed by: NURSE PRACTITIONER

## 2021-09-14 PROCEDURE — 3051F PR MOST RECENT HEMOGLOBIN A1C LEVEL 7.0 - < 8.0%: ICD-10-PCS | Mod: CPTII,S$GLB,, | Performed by: NURSE PRACTITIONER

## 2021-09-14 PROCEDURE — 3077F PR MOST RECENT SYSTOLIC BLOOD PRESSURE >= 140 MM HG: ICD-10-PCS | Mod: CPTII,S$GLB,, | Performed by: NURSE PRACTITIONER

## 2021-09-14 PROCEDURE — 3066F NEPHROPATHY DOC TX: CPT | Mod: CPTII,S$GLB,, | Performed by: NURSE PRACTITIONER

## 2021-09-14 PROCEDURE — 3008F BODY MASS INDEX DOCD: CPT | Mod: CPTII,S$GLB,, | Performed by: NURSE PRACTITIONER

## 2021-09-14 PROCEDURE — 3008F PR BODY MASS INDEX (BMI) DOCUMENTED: ICD-10-PCS | Mod: CPTII,S$GLB,, | Performed by: NURSE PRACTITIONER

## 2021-09-14 PROCEDURE — 1159F MED LIST DOCD IN RCRD: CPT | Mod: CPTII,S$GLB,, | Performed by: NURSE PRACTITIONER

## 2021-09-14 PROCEDURE — 3061F NEG MICROALBUMINURIA REV: CPT | Mod: CPTII,S$GLB,, | Performed by: NURSE PRACTITIONER

## 2021-09-14 PROCEDURE — 3077F SYST BP >= 140 MM HG: CPT | Mod: CPTII,S$GLB,, | Performed by: NURSE PRACTITIONER

## 2021-09-14 PROCEDURE — 3066F PR DOCUMENTATION OF TREATMENT FOR NEPHROPATHY: ICD-10-PCS | Mod: CPTII,S$GLB,, | Performed by: NURSE PRACTITIONER

## 2021-09-14 PROCEDURE — 1159F PR MEDICATION LIST DOCUMENTED IN MEDICAL RECORD: ICD-10-PCS | Mod: CPTII,S$GLB,, | Performed by: NURSE PRACTITIONER

## 2021-09-14 PROCEDURE — 99999 PR PBB SHADOW E&M-EST. PATIENT-LVL III: CPT | Mod: PBBFAC,,, | Performed by: NURSE PRACTITIONER

## 2021-09-14 PROCEDURE — 3051F HG A1C>EQUAL 7.0%<8.0%: CPT | Mod: CPTII,S$GLB,, | Performed by: NURSE PRACTITIONER

## 2021-09-14 PROCEDURE — 99999 PR PBB SHADOW E&M-EST. PATIENT-LVL III: ICD-10-PCS | Mod: PBBFAC,,, | Performed by: NURSE PRACTITIONER

## 2021-09-14 PROCEDURE — 99203 OFFICE O/P NEW LOW 30 MIN: CPT | Mod: S$GLB,,, | Performed by: NURSE PRACTITIONER

## 2021-09-14 PROCEDURE — 3061F PR NEG MICROALBUMINURIA RESULT DOCUMENTED/REVIEW: ICD-10-PCS | Mod: CPTII,S$GLB,, | Performed by: NURSE PRACTITIONER

## 2021-09-14 PROCEDURE — 3080F DIAST BP >= 90 MM HG: CPT | Mod: CPTII,S$GLB,, | Performed by: NURSE PRACTITIONER

## 2021-09-14 PROCEDURE — 3080F PR MOST RECENT DIASTOLIC BLOOD PRESSURE >= 90 MM HG: ICD-10-PCS | Mod: CPTII,S$GLB,, | Performed by: NURSE PRACTITIONER

## 2021-09-14 RX ORDER — AMOXICILLIN AND CLAVULANATE POTASSIUM 875; 125 MG/1; MG/1
1 TABLET, FILM COATED ORAL EVERY 12 HOURS
Qty: 10 TABLET | Refills: 0 | Status: SHIPPED | OUTPATIENT
Start: 2021-09-14 | End: 2021-09-19

## 2021-09-20 ENCOUNTER — TELEPHONE (OUTPATIENT)
Dept: SLEEP MEDICINE | Facility: HOSPITAL | Age: 61
End: 2021-09-20

## 2021-09-20 ENCOUNTER — OFFICE VISIT (OUTPATIENT)
Dept: OTOLARYNGOLOGY | Facility: CLINIC | Age: 61
End: 2021-09-20
Payer: COMMERCIAL

## 2021-09-20 VITALS
TEMPERATURE: 98 F | WEIGHT: 211.75 LBS | BODY MASS INDEX: 35.28 KG/M2 | DIASTOLIC BLOOD PRESSURE: 80 MMHG | HEIGHT: 65 IN | SYSTOLIC BLOOD PRESSURE: 124 MMHG

## 2021-09-20 DIAGNOSIS — J34.3 HYPERTROPHY OF INFERIOR NASAL TURBINATE: ICD-10-CM

## 2021-09-20 DIAGNOSIS — R06.83 SNORING: Primary | ICD-10-CM

## 2021-09-20 DIAGNOSIS — R49.0 DYSPHONIA: ICD-10-CM

## 2021-09-20 DIAGNOSIS — J30.2 SEASONAL ALLERGIC RHINITIS, UNSPECIFIED TRIGGER: ICD-10-CM

## 2021-09-20 DIAGNOSIS — R09.89 THROAT CLEARING: ICD-10-CM

## 2021-09-20 DIAGNOSIS — R68.2 DRY MOUTH: ICD-10-CM

## 2021-09-20 PROCEDURE — 3008F BODY MASS INDEX DOCD: CPT | Mod: CPTII,S$GLB,, | Performed by: OTOLARYNGOLOGY

## 2021-09-20 PROCEDURE — 3051F HG A1C>EQUAL 7.0%<8.0%: CPT | Mod: CPTII,S$GLB,, | Performed by: OTOLARYNGOLOGY

## 2021-09-20 PROCEDURE — 3074F SYST BP LT 130 MM HG: CPT | Mod: CPTII,S$GLB,, | Performed by: OTOLARYNGOLOGY

## 2021-09-20 PROCEDURE — 3079F PR MOST RECENT DIASTOLIC BLOOD PRESSURE 80-89 MM HG: ICD-10-PCS | Mod: CPTII,S$GLB,, | Performed by: OTOLARYNGOLOGY

## 2021-09-20 PROCEDURE — 99214 PR OFFICE/OUTPT VISIT, EST, LEVL IV, 30-39 MIN: ICD-10-PCS | Mod: S$GLB,,, | Performed by: OTOLARYNGOLOGY

## 2021-09-20 PROCEDURE — 3066F NEPHROPATHY DOC TX: CPT | Mod: CPTII,S$GLB,, | Performed by: OTOLARYNGOLOGY

## 2021-09-20 PROCEDURE — 3066F PR DOCUMENTATION OF TREATMENT FOR NEPHROPATHY: ICD-10-PCS | Mod: CPTII,S$GLB,, | Performed by: OTOLARYNGOLOGY

## 2021-09-20 PROCEDURE — 3061F NEG MICROALBUMINURIA REV: CPT | Mod: CPTII,S$GLB,, | Performed by: OTOLARYNGOLOGY

## 2021-09-20 PROCEDURE — 3079F DIAST BP 80-89 MM HG: CPT | Mod: CPTII,S$GLB,, | Performed by: OTOLARYNGOLOGY

## 2021-09-20 PROCEDURE — 3051F PR MOST RECENT HEMOGLOBIN A1C LEVEL 7.0 - < 8.0%: ICD-10-PCS | Mod: CPTII,S$GLB,, | Performed by: OTOLARYNGOLOGY

## 2021-09-20 PROCEDURE — 3061F PR NEG MICROALBUMINURIA RESULT DOCUMENTED/REVIEW: ICD-10-PCS | Mod: CPTII,S$GLB,, | Performed by: OTOLARYNGOLOGY

## 2021-09-20 PROCEDURE — 3074F PR MOST RECENT SYSTOLIC BLOOD PRESSURE < 130 MM HG: ICD-10-PCS | Mod: CPTII,S$GLB,, | Performed by: OTOLARYNGOLOGY

## 2021-09-20 PROCEDURE — 3008F PR BODY MASS INDEX (BMI) DOCUMENTED: ICD-10-PCS | Mod: CPTII,S$GLB,, | Performed by: OTOLARYNGOLOGY

## 2021-09-20 PROCEDURE — 99214 OFFICE O/P EST MOD 30 MIN: CPT | Mod: S$GLB,,, | Performed by: OTOLARYNGOLOGY

## 2021-09-20 RX ORDER — FLUTICASONE PROPIONATE 50 MCG
1 SPRAY, SUSPENSION (ML) NASAL 2 TIMES DAILY
Qty: 18.2 ML | Refills: 3 | Status: SHIPPED | OUTPATIENT
Start: 2021-09-20 | End: 2021-12-06

## 2021-09-20 RX ORDER — AZELASTINE 1 MG/ML
1 SPRAY, METERED NASAL 2 TIMES DAILY
Qty: 30 ML | Refills: 3 | Status: SHIPPED | OUTPATIENT
Start: 2021-09-20 | End: 2021-12-13

## 2021-11-03 ENCOUNTER — PATIENT MESSAGE (OUTPATIENT)
Dept: ADMINISTRATIVE | Facility: HOSPITAL | Age: 61
End: 2021-11-03
Payer: COMMERCIAL

## 2022-02-02 ENCOUNTER — PATIENT MESSAGE (OUTPATIENT)
Dept: ADMINISTRATIVE | Facility: HOSPITAL | Age: 62
End: 2022-02-02
Payer: COMMERCIAL

## 2022-03-17 ENCOUNTER — OFFICE VISIT (OUTPATIENT)
Dept: FAMILY MEDICINE | Facility: CLINIC | Age: 62
End: 2022-03-17
Payer: COMMERCIAL

## 2022-03-17 VITALS
BODY MASS INDEX: 34.08 KG/M2 | HEIGHT: 65 IN | DIASTOLIC BLOOD PRESSURE: 66 MMHG | SYSTOLIC BLOOD PRESSURE: 108 MMHG | OXYGEN SATURATION: 96 % | WEIGHT: 204.56 LBS | TEMPERATURE: 99 F | HEART RATE: 74 BPM

## 2022-03-17 DIAGNOSIS — Z72.0 TOBACCO USE: ICD-10-CM

## 2022-03-17 DIAGNOSIS — Z23 ENCOUNTER FOR ADMINISTRATION OF VACCINE: ICD-10-CM

## 2022-03-17 DIAGNOSIS — E78.5 HYPERLIPIDEMIA, UNSPECIFIED HYPERLIPIDEMIA TYPE: ICD-10-CM

## 2022-03-17 DIAGNOSIS — E66.9 OBESITY (BMI 30-39.9): ICD-10-CM

## 2022-03-17 DIAGNOSIS — E55.9 VITAMIN D DEFICIENCY: ICD-10-CM

## 2022-03-17 DIAGNOSIS — F17.200 TOBACCO DEPENDENCE: ICD-10-CM

## 2022-03-17 DIAGNOSIS — I10 ESSENTIAL HYPERTENSION: ICD-10-CM

## 2022-03-17 DIAGNOSIS — N52.8 OTHER MALE ERECTILE DYSFUNCTION: ICD-10-CM

## 2022-03-17 DIAGNOSIS — E78.2 MIXED HYPERLIPIDEMIA: ICD-10-CM

## 2022-03-17 DIAGNOSIS — E11.9 CONTROLLED TYPE 2 DIABETES MELLITUS WITHOUT COMPLICATION, WITHOUT LONG-TERM CURRENT USE OF INSULIN: Primary | ICD-10-CM

## 2022-03-17 DIAGNOSIS — E03.8 OTHER SPECIFIED HYPOTHYROIDISM: ICD-10-CM

## 2022-03-17 PROCEDURE — 3008F PR BODY MASS INDEX (BMI) DOCUMENTED: ICD-10-PCS | Mod: CPTII,S$GLB,, | Performed by: FAMILY MEDICINE

## 2022-03-17 PROCEDURE — 3078F PR MOST RECENT DIASTOLIC BLOOD PRESSURE < 80 MM HG: ICD-10-PCS | Mod: CPTII,S$GLB,, | Performed by: FAMILY MEDICINE

## 2022-03-17 PROCEDURE — 3051F PR MOST RECENT HEMOGLOBIN A1C LEVEL 7.0 - < 8.0%: ICD-10-PCS | Mod: CPTII,S$GLB,, | Performed by: FAMILY MEDICINE

## 2022-03-17 PROCEDURE — 90471 FLU VACCINE (QUAD) GREATER THAN OR EQUAL TO 3YO PRESERVATIVE FREE IM: ICD-10-PCS | Mod: S$GLB,,, | Performed by: FAMILY MEDICINE

## 2022-03-17 PROCEDURE — 99214 OFFICE O/P EST MOD 30 MIN: CPT | Mod: 25,S$GLB,, | Performed by: FAMILY MEDICINE

## 2022-03-17 PROCEDURE — 99214 PR OFFICE/OUTPT VISIT, EST, LEVL IV, 30-39 MIN: ICD-10-PCS | Mod: 25,S$GLB,, | Performed by: FAMILY MEDICINE

## 2022-03-17 PROCEDURE — 3074F PR MOST RECENT SYSTOLIC BLOOD PRESSURE < 130 MM HG: ICD-10-PCS | Mod: CPTII,S$GLB,, | Performed by: FAMILY MEDICINE

## 2022-03-17 PROCEDURE — 90686 IIV4 VACC NO PRSV 0.5 ML IM: CPT | Mod: S$GLB,,, | Performed by: FAMILY MEDICINE

## 2022-03-17 PROCEDURE — 3051F HG A1C>EQUAL 7.0%<8.0%: CPT | Mod: CPTII,S$GLB,, | Performed by: FAMILY MEDICINE

## 2022-03-17 PROCEDURE — 90471 IMMUNIZATION ADMIN: CPT | Mod: S$GLB,,, | Performed by: FAMILY MEDICINE

## 2022-03-17 PROCEDURE — 3078F DIAST BP <80 MM HG: CPT | Mod: CPTII,S$GLB,, | Performed by: FAMILY MEDICINE

## 2022-03-17 PROCEDURE — 99999 PR PBB SHADOW E&M-EST. PATIENT-LVL III: ICD-10-PCS | Mod: PBBFAC,,, | Performed by: FAMILY MEDICINE

## 2022-03-17 PROCEDURE — 99999 PR PBB SHADOW E&M-EST. PATIENT-LVL III: CPT | Mod: PBBFAC,,, | Performed by: FAMILY MEDICINE

## 2022-03-17 PROCEDURE — 90686 FLU VACCINE (QUAD) GREATER THAN OR EQUAL TO 3YO PRESERVATIVE FREE IM: ICD-10-PCS | Mod: S$GLB,,, | Performed by: FAMILY MEDICINE

## 2022-03-17 PROCEDURE — 3008F BODY MASS INDEX DOCD: CPT | Mod: CPTII,S$GLB,, | Performed by: FAMILY MEDICINE

## 2022-03-17 PROCEDURE — 3074F SYST BP LT 130 MM HG: CPT | Mod: CPTII,S$GLB,, | Performed by: FAMILY MEDICINE

## 2022-03-17 RX ORDER — GLIPIZIDE 10 MG/1
10 TABLET ORAL 2 TIMES DAILY WITH MEALS
Qty: 180 TABLET | Refills: 1 | Status: SHIPPED | OUTPATIENT
Start: 2022-03-17 | End: 2022-03-24 | Stop reason: SDUPTHER

## 2022-03-17 RX ORDER — IBUPROFEN 200 MG
1 TABLET ORAL DAILY
Qty: 21 PATCH | Refills: 15 | Status: SHIPPED | OUTPATIENT
Start: 2022-03-17 | End: 2023-01-26 | Stop reason: SDUPTHER

## 2022-03-17 RX ORDER — TADALAFIL 20 MG/1
20 TABLET ORAL DAILY
Qty: 30 TABLET | Refills: 5 | Status: SHIPPED | OUTPATIENT
Start: 2022-03-17 | End: 2022-07-07 | Stop reason: SDUPTHER

## 2022-03-17 RX ORDER — ATORVASTATIN CALCIUM 20 MG/1
20 TABLET, FILM COATED ORAL DAILY
Qty: 90 TABLET | Refills: 3 | Status: SHIPPED | OUTPATIENT
Start: 2022-03-17 | End: 2022-07-07 | Stop reason: SDUPTHER

## 2022-03-17 RX ORDER — AMLODIPINE BESYLATE 10 MG/1
10 TABLET ORAL DAILY
Qty: 90 TABLET | Refills: 1 | Status: SHIPPED | OUTPATIENT
Start: 2022-03-17 | End: 2022-07-07 | Stop reason: SDUPTHER

## 2022-03-17 RX ORDER — LEVOTHYROXINE SODIUM 50 UG/1
50 TABLET ORAL
Qty: 90 TABLET | Refills: 1 | Status: SHIPPED | OUTPATIENT
Start: 2022-03-17 | End: 2022-03-24 | Stop reason: SDUPTHER

## 2022-03-17 RX ORDER — METFORMIN HYDROCHLORIDE 500 MG/1
1000 TABLET, EXTENDED RELEASE ORAL 2 TIMES DAILY WITH MEALS
Qty: 180 TABLET | Refills: 1 | Status: SHIPPED | OUTPATIENT
Start: 2022-03-17 | End: 2022-03-24 | Stop reason: SDUPTHER

## 2022-03-17 RX ORDER — ERGOCALCIFEROL 1.25 MG/1
50000 CAPSULE ORAL
Qty: 12 CAPSULE | Refills: 3 | Status: SHIPPED | OUTPATIENT
Start: 2022-03-17 | End: 2023-01-26 | Stop reason: SDUPTHER

## 2022-03-17 NOTE — PROGRESS NOTES
"  Physical Exam  /66   Pulse 74   Temp 98.7 °F (37.1 °C) (Oral)   Ht 5' 5" (1.651 m)   Wt 92.8 kg (204 lb 9.4 oz)   SpO2 96%   BMI 34.05 kg/m²      Office Visit    Patient Name: Vijay Rodriguez Jr.    : 1960  MRN: [id number]      Assessment/Plan:  Vijay Rodriguez Jr. is a 61 y.o. male who presents today for :    Controlled type 2 diabetes mellitus without complication, without long-term current use of insulin  -     metFORMIN (GLUCOPHAGE-XR) 500 MG ER 24hr tablet; Take 2 tablets (1,000 mg total) by mouth 2 (two) times daily with meals. Followup Dr.T Prado 2022 for more refills, call to schedule/get labs 1wk before doctor's appointment (ordered)  Dispense: 180 tablet; Refill: 1  -     glipiZIDE (GLUCOTROL) 10 MG tablet; Take 1 tablet (10 mg total) by mouth 2 (two) times daily with meals. Followup Dr.T Prado 2022 for more refills, call to schedule/get labs 1wk before doctor's appointment (ordered)  Dispense: 180 tablet; Refill: 1  Obesity (BMI 30-39.9)  -previous A1c reviewed:   Lab Results   Component Value Date    HGBA1C 7.8 (H) 2021     -overdue for repeat labs, goal of A1c <7%, advised to take Glipizide daily as previously prescribed, as well as continue with Metformin. Stressed the need to check BG daily, as well as reviewed routine diabetic care. Eye exam due.   -weight loss and regular physical excercise    -Essential hypertension  -     amLODIPine (NORVASC) 10 MG tablet; Take 1 tablet (10 mg total) by mouth once daily. Followup Dr.T Prado 2022 for more refills, call to schedule/get labs 1wk before doctor's appointment (ordered)  Dispense: 90 tablet; Refill: 1  Tobacco use  -     nicotine (NICODERM CQ) 14 mg/24 hr; Place 1 patch onto the skin once daily.  Dispense: 21 patch; Refill: 15    Hyperlipidemia, unspecified hyperlipidemia type  -     atorvastatin (LIPITOR) 20 MG tablet; Take 1 tablet (20 mg total) by mouth once daily.  Dispense: 90 tablet; " Refill: 3    -continue current medication regimen  -DASH diet, regular cardiovascular exercises  -weight loss  -smoking cessation    Other specified hypothyroidism  -     levothyroxine (SYNTHROID) 50 MCG tablet; Take 1 tablet (50 mcg total) by mouth before breakfast. Followup Dr.T Eve AARON-2022 for more refills, call to schedule/get labs 1wk before doctor's appointment (ordered)  Dispense: 90 tablet; Refill: 1  -stable, continue Levothyroxine, recheck TSH  -last TSH shows   Lab Results   Component Value Date    TSH 2.46 04/09/2021       Encounter for administration of vaccine  -     Influenza - Quadrivalent (PF)    Vitamin D deficiency  -     VITAMIN D2 1,250 mcg (50,000 unit) capsule; Take 1 capsule (50,000 Units total) by mouth every 7 days.  Dispense: 12 capsule; Refill: 3    Other male erectile dysfunction  -     tadalafiL (CIALIS) 20 MG Tab; Take 1 tablet (20 mg total) by mouth once daily.  Dispense: 30 tablet; Refill: 5      Follow up 6mo    This note was created by combination of typed  and MModal dictation.  Transcription errors may be present.  If there are any questions, please contact me.         ----------------------------------------------------------------------------------------------------------------------      HPI:  Patient Care Team:  Isidro Prado MD as PCP - General (Family Medicine)  Francia Solorio MA (Inactive) as Care Coordinator    Vijay is a 61 y.o. male with      Patient Active Problem List   Diagnosis    Elbow pain    Essential hypertension    Allergic rhinitis    Stress    Arm numbness left    Other specified hypothyroidism    Obesity (BMI 30-39.9)    Hyperlipidemia    Prediabetes    Tobacco dependence    Tobacco use    Controlled type 2 diabetes mellitus without complication, without long-term current use of insulin    Chronic maxillary sinusitis    Knee pain    Hepatitis C antibody positive in blood       Patient with PMHx as above presents today  for :  Diabetes, Hypertension, and Immunizations      DM - patient is compliant with Metformin daily, no side effects. He had glipizide added to his regimen at last OV with me on 8/27/21, but did not pick it up and had forgotten about taking it since. He admits he has not been checking his FBG readings at home at all. He denies any polyuria/polydipsia. No foot numbness/tingling/vision changes/hypoglycemia. His BP is well controlled on current regimen.           Hemoglobin A1C   Date Value Ref Range Status   04/09/2021 7.8 (H) <5.7 % of total Hgb Final     Comment:     For someone without known diabetes, a hemoglobin A1c  value of 6.5% or greater indicates that they may have   diabetes and this should be confirmed with a follow-up   test.     For someone with known diabetes, a value <7% indicates   that their diabetes is well controlled and a value   greater than or equal to 7% indicates suboptimal   control. A1c targets should be individualized based on   duration of diabetes, age, comorbid conditions, and   other considerations.     Currently, no consensus exists regarding use of  hemoglobin A1c for diagnosis of diabetes for children.         07/07/2020 8.0 (H) <5.7 % of total Hgb Final     Comment:     For someone without known diabetes, a hemoglobin A1c  value of 6.5% or greater indicates that they may have   diabetes and this should be confirmed with a follow-up   test.     For someone with known diabetes, a value <7% indicates   that their diabetes is well controlled and a value   greater than or equal to 7% indicates suboptimal   control. A1c targets should be individualized based on   duration of diabetes, age, comorbid conditions, and   other considerations.     Currently, no consensus exists regarding use of  hemoglobin A1c for diagnosis of diabetes for children.         01/30/2019 6.6 (H) <5.7 % of total Hgb Final     Comment:     For someone without known diabetes, a hemoglobin A1c  value of 6.5% or  greater indicates that they may have   diabetes and this should be confirmed with a follow-up   test.     For someone with known diabetes, a value <7% indicates   that their diabetes is well controlled and a value   greater than or equal to 7% indicates suboptimal   control. A1c targets should be individualized based on   duration of diabetes, age, comorbid conditions, and   other considerations.     Currently, no consensus exists regarding use of  hemoglobin A1c for diagnosis of diabetes for children.               Additional ROS      CONST: no fever, no activity change  EYES: no vision change.   ENT: no sore throat. No dysphagia.   CV: no CP with exertion  RESP: no SOB  GI: no N/V/diarrhea/constipation  : no urinary concerns  MSK: no new myalgias or arthralgias.   SKIN: no new rashes  NEURO: no focal deficits.   PSYCH: no new issues.   ENDOCRINE: no polyuria.                   Patient Active Problem List   Diagnosis    Elbow pain    Essential hypertension    Allergic rhinitis    Stress    Arm numbness left    Other specified hypothyroidism    Obesity (BMI 30-39.9)    Hyperlipidemia    Prediabetes    Tobacco dependence    Tobacco use    Controlled type 2 diabetes mellitus without complication, without long-term current use of insulin    Chronic maxillary sinusitis    Knee pain    Hepatitis C antibody positive in blood       Current Medications  Medications reviewed/updated.     Current Outpatient Medications on File Prior to Visit   Medication Sig Dispense Refill    azelastine (ASTELIN) 137 mcg (0.1 %) nasal spray 1 SPRAY (137 MCG TOTAL) BY NASAL ROUTE 2 (TWO) TIMES DAILY. 30 mL 11    EPINEPHrine (EPIPEN) 0.3 mg/0.3 mL AtIn TAKE 1 AUTO(S) AS NEEDED BY INJECTION ROUTE.  1    fluticasone propionate (FLONASE) 50 mcg/actuation nasal spray 1 SPRAY (50 MCG TOTAL) BY EACH NOSTRIL ROUTE 2 (TWO) TIMES DAILY. 48 mL 1    [DISCONTINUED] amLODIPine (NORVASC) 10 MG tablet Take 1 tablet (10 mg total) by mouth  once daily. Followup Dr.T Prado FEB 2022 for more refills, call to schedule/get labs 1wk before doctor's appointment (ordered) 90 tablet 0    [DISCONTINUED] atorvastatin (LIPITOR) 20 MG tablet Take 1 tablet (20 mg total) by mouth once daily. 90 tablet 3    [DISCONTINUED] glipiZIDE (GLUCOTROL) 10 MG tablet Take 1 tablet (10 mg total) by mouth 2 (two) times daily before meals. Followup Dr.T Prado FEBRUARY 2022 for more refills, call to schedule/get labs 1wk before doctor's appointment (ordered) 180 tablet 1    [DISCONTINUED] levothyroxine (SYNTHROID) 50 MCG tablet Take 1 tablet (50 mcg total) by mouth before breakfast. Followup Dr.T Prado SEPTMBER-2021 for more refills, call to schedule/get labs 1wk before doctor's appointment (ordered) 90 tablet 1    [DISCONTINUED] metFORMIN (GLUCOPHAGE-XR) 500 MG ER 24hr tablet Take 2 tablets (1,000 mg total) by mouth once daily. Followup Dr.T Prado FEBRUARY 2022 for more refills, call to schedule/get labs 1wk before doctor's appointment (ordered) 180 tablet 0    [DISCONTINUED] nicotine (NICODERM CQ) 14 mg/24 hr Place 1 patch onto the skin once daily. 21 patch 15    [DISCONTINUED] sars-cov-2, covid-19, (MODERNA COVID-19) 100 mcg/0.5 ml injection       [DISCONTINUED] tadalafiL (CIALIS) 20 MG Tab Take 1 tablet (20 mg total) by mouth once daily. 30 tablet 0    [DISCONTINUED] VITAMIN D2 50,000 unit capsule       albuterol (PROVENTIL/VENTOLIN HFA) 90 mcg/actuation inhaler Inhale 2 puffs into the lungs.      ranitidine (ZANTAC) 300 MG tablet Take 1 tablet by mouth.      [DISCONTINUED] amoxicillin (AMOXIL) 500 MG capsule TAKE ONE CAPSULE BY MOUTH THREE TIMES DAILY FOR 7 DAYS      [DISCONTINUED] cyclobenzaprine (FLEXERIL) 10 MG tablet Take 10 mg by mouth.      [DISCONTINUED] HYDROcodone-acetaminophen (NORCO) 5-325 mg per tablet Take 1 tablet by mouth every 4 to 6 hours as needed.       No current facility-administered medications on file prior to visit.           Past  "Surgical History:   Procedure Laterality Date    PROSTATE SURGERY  2007       Family History   Problem Relation Age of Onset    Cancer Mother 60        colon    Hypertension Mother     Cancer Father 60        prostate    Diabetes Father     Hypertension Father     Hypertension Brother     Cancer Brother 52        pancreatic       Social History     Socioeconomic History    Marital status:    Occupational History     Employer: [employer]   Tobacco Use    Smoking status: Current Every Day Smoker     Packs/day: 0.25     Years: 30.00     Pack years: 7.50   Substance and Sexual Activity    Alcohol use: Yes     Comment: 6 pk beer per week    Drug use: No             Allergies   Review of patient's allergies indicates:   Allergen Reactions    Hymenoptera allergenic extract Anaphylaxis    Insects extract     Lisinopril Other (See Comments)     cough    Shellfish containing products Hives             Review of Systems  See HPI      [unfilled]   108/66   Pulse 74   Temp 98.7 °F (37.1 °C) (Oral)   Ht 5' 5" (1.651 m)   Wt 92.8 kg (204 lb 9.4 oz)   SpO2 96%   BMI 34.05 kg/m²       GEN: NAD, pleasant  HEENT: NCAT, PERRLA, EOMI, sclera clear, anicteric, O/P clear  NECK: normal, supple  LUNGS: CTAB, no w/r/r, normal respiratory effort  HEART: RRR, normal S1 and S2, no m/r/g, no palpitations, no edema  ABD: s/nt/nd, NABS, no organomegaly  SKIN: warm and dry with normal turgor, no rashes, no other lesions.   PSYCH: AOx3, appropriate mood and affect.   MSK: extremities warm/well perfused, normal ROM in all 4 extremities, no c/c/e.   NEURO: normal without focal findings, CN II-XII are intact.      "

## 2022-03-23 LAB
ALBUMIN SERPL-MCNC: 4.3 G/DL (ref 3.6–5.1)
ALBUMIN/CREAT UR: 2 MCG/MG CREAT
ALBUMIN/GLOB SERPL: 1.5 (CALC) (ref 1–2.5)
ALP SERPL-CCNC: 60 U/L (ref 35–144)
ALT SERPL-CCNC: 14 U/L (ref 9–46)
AST SERPL-CCNC: 14 U/L (ref 10–35)
BILIRUB SERPL-MCNC: 0.2 MG/DL (ref 0.2–1.2)
BUN SERPL-MCNC: 15 MG/DL (ref 7–25)
BUN/CREAT SERPL: ABNORMAL (CALC) (ref 6–22)
CALCIUM SERPL-MCNC: 9.2 MG/DL (ref 8.6–10.3)
CHLORIDE SERPL-SCNC: 107 MMOL/L (ref 98–110)
CHOLEST SERPL-MCNC: 143 MG/DL
CHOLEST/HDLC SERPL: 3.9 (CALC)
CO2 SERPL-SCNC: 22 MMOL/L (ref 20–32)
CREAT SERPL-MCNC: 1.05 MG/DL (ref 0.7–1.25)
CREAT UR-MCNC: 258 MG/DL (ref 20–320)
ERYTHROCYTE [DISTWIDTH] IN BLOOD BY AUTOMATED COUNT: 12.9 % (ref 11–15)
GLOBULIN SER CALC-MCNC: 2.9 G/DL (CALC) (ref 1.9–3.7)
GLUCOSE SERPL-MCNC: 211 MG/DL (ref 65–99)
HBA1C MFR BLD: 9.6 % OF TOTAL HGB
HCT VFR BLD AUTO: 43.6 % (ref 38.5–50)
HCV RNA SERPL NAA+PROBE-ACNC: NORMAL IU/ML
HCV RNA SERPL NAA+PROBE-LOG IU: NORMAL LOG IU/ML
HDLC SERPL-MCNC: 37 MG/DL
HGB BLD-MCNC: 14.2 G/DL (ref 13.2–17.1)
LDLC SERPL CALC-MCNC: 82 MG/DL (CALC)
MCH RBC QN AUTO: 29.8 PG (ref 27–33)
MCHC RBC AUTO-ENTMCNC: 32.6 G/DL (ref 32–36)
MCV RBC AUTO: 91.6 FL (ref 80–100)
MICROALBUMIN UR-MCNC: 0.5 MG/DL
NONHDLC SERPL-MCNC: 106 MG/DL (CALC)
PLATELET # BLD AUTO: 319 THOUSAND/UL (ref 140–400)
PMV BLD REES-ECKER: 9.2 FL (ref 7.5–12.5)
POTASSIUM SERPL-SCNC: 4.3 MMOL/L (ref 3.5–5.3)
PROT SERPL-MCNC: 7.2 G/DL (ref 6.1–8.1)
RBC # BLD AUTO: 4.76 MILLION/UL (ref 4.2–5.8)
SODIUM SERPL-SCNC: 137 MMOL/L (ref 135–146)
TRIGL SERPL-MCNC: 138 MG/DL
TSH SERPL-ACNC: 6.19 MIU/L (ref 0.4–4.5)
WBC # BLD AUTO: 7.8 THOUSAND/UL (ref 3.8–10.8)

## 2022-03-24 RX ORDER — METFORMIN HYDROCHLORIDE 500 MG/1
1000 TABLET, EXTENDED RELEASE ORAL 2 TIMES DAILY WITH MEALS
Qty: 180 TABLET | Refills: 0 | Status: SHIPPED | OUTPATIENT
Start: 2022-03-24 | End: 2022-07-07 | Stop reason: SDUPTHER

## 2022-03-24 RX ORDER — LEVOTHYROXINE SODIUM 50 UG/1
50 TABLET ORAL
Qty: 90 TABLET | Refills: 0 | Status: SHIPPED | OUTPATIENT
Start: 2022-03-24 | End: 2022-07-07 | Stop reason: SDUPTHER

## 2022-03-24 RX ORDER — GLIPIZIDE 10 MG/1
10 TABLET ORAL 2 TIMES DAILY WITH MEALS
Qty: 180 TABLET | Refills: 0 | Status: SHIPPED | OUTPATIENT
Start: 2022-03-24 | End: 2022-07-07 | Stop reason: SDUPTHER

## 2022-05-23 ENCOUNTER — PATIENT MESSAGE (OUTPATIENT)
Dept: SMOKING CESSATION | Facility: CLINIC | Age: 62
End: 2022-05-23
Payer: COMMERCIAL

## 2022-05-31 ENCOUNTER — PATIENT MESSAGE (OUTPATIENT)
Dept: ADMINISTRATIVE | Facility: HOSPITAL | Age: 62
End: 2022-05-31
Payer: COMMERCIAL

## 2022-07-13 ENCOUNTER — PATIENT OUTREACH (OUTPATIENT)
Dept: ADMINISTRATIVE | Facility: HOSPITAL | Age: 62
End: 2022-07-13
Payer: COMMERCIAL

## 2022-07-13 DIAGNOSIS — E11.9 TYPE 2 DIABETES MELLITUS WITHOUT COMPLICATION, UNSPECIFIED WHETHER LONG TERM INSULIN USE: Primary | ICD-10-CM

## 2022-07-27 DIAGNOSIS — E11.9 TYPE 2 DIABETES MELLITUS WITHOUT COMPLICATION, UNSPECIFIED WHETHER LONG TERM INSULIN USE: ICD-10-CM

## 2022-08-16 ENCOUNTER — PATIENT OUTREACH (OUTPATIENT)
Dept: ADMINISTRATIVE | Facility: HOSPITAL | Age: 62
End: 2022-08-16
Payer: COMMERCIAL

## 2022-09-19 ENCOUNTER — PATIENT MESSAGE (OUTPATIENT)
Dept: ADMINISTRATIVE | Facility: HOSPITAL | Age: 62
End: 2022-09-19
Payer: COMMERCIAL

## 2022-10-11 ENCOUNTER — PATIENT MESSAGE (OUTPATIENT)
Dept: ADMINISTRATIVE | Facility: HOSPITAL | Age: 62
End: 2022-10-11
Payer: COMMERCIAL

## 2022-12-13 ENCOUNTER — PATIENT OUTREACH (OUTPATIENT)
Dept: ADMINISTRATIVE | Facility: HOSPITAL | Age: 62
End: 2022-12-13
Payer: COMMERCIAL

## 2022-12-13 ENCOUNTER — PATIENT MESSAGE (OUTPATIENT)
Dept: ADMINISTRATIVE | Facility: HOSPITAL | Age: 62
End: 2022-12-13
Payer: COMMERCIAL

## 2022-12-19 ENCOUNTER — PATIENT MESSAGE (OUTPATIENT)
Dept: ADMINISTRATIVE | Facility: HOSPITAL | Age: 62
End: 2022-12-19
Payer: COMMERCIAL

## 2023-01-04 ENCOUNTER — TELEPHONE (OUTPATIENT)
Dept: FAMILY MEDICINE | Facility: CLINIC | Age: 63
End: 2023-01-04
Payer: COMMERCIAL

## 2023-01-04 DIAGNOSIS — I10 ESSENTIAL HYPERTENSION, BENIGN: ICD-10-CM

## 2023-01-04 DIAGNOSIS — E07.9 DISEASE OF THYROID GLAND: ICD-10-CM

## 2023-01-04 DIAGNOSIS — E11.00 TYPE II DIABETES MELLITUS WITH HYPEROSMOLARITY, UNCONTROLLED: Primary | ICD-10-CM

## 2023-01-04 DIAGNOSIS — Z00.00 ANNUAL PHYSICAL EXAM: ICD-10-CM

## 2023-01-04 DIAGNOSIS — E11.9 CONTROLLED TYPE 2 DIABETES MELLITUS WITHOUT COMPLICATION, WITHOUT LONG-TERM CURRENT USE OF INSULIN: ICD-10-CM

## 2023-01-04 DIAGNOSIS — E78.5 HYPERLIPIDEMIA, UNSPECIFIED HYPERLIPIDEMIA TYPE: ICD-10-CM

## 2023-01-04 NOTE — TELEPHONE ENCOUNTER
Entered as requested (for [facility]). Since the labs were routed to [facility], you don't have to schedule him. He can just show up during their business hours. Remind patient to fast 8 hours prior. (Ok to drink a cup of water)    Thanks.    Type II diabetes mellitus with hyperosmolarity, uncontrolled  -     Hemoglobin A1C; Future; Expected date: 01/04/2023    Disease of thyroid gland  -     TSH; Future; Expected date: 01/04/2023  -     TSH; Future; Expected date: 01/04/2023  -     T4, Free; Future; Expected date: 01/04/2023  -     T3; Future; Expected date: 01/04/2023    Hyperlipidemia, unspecified hyperlipidemia type  -     Lipid Panel; Future; Expected date: 01/04/2023    Essential hypertension, benign  -     COMPREHENSIVE METABOLIC PANEL; Future; Expected date: 01/04/2023    Controlled type 2 diabetes mellitus without complication, without long-term current use of insulin  -     Hemoglobin A1C; Future; Expected date: 01/04/2023  -     CBC Without Differential; Future; Expected date: 01/04/2023  -     Comprehensive Metabolic Panel; Future; Expected date: 01/04/2023  -     Lipid Panel; Future; Expected date: 01/04/2023  -     Microalbumin/Creatinine Ratio, Urine; Future; Expected date: 01/04/2023  -     TSH; Future; Expected date: 01/04/2023    Annual physical exam  -     Hemoglobin A1C; Future; Expected date: 01/04/2023  -     CBC Without Differential; Future; Expected date: 01/04/2023  -     Comprehensive Metabolic Panel; Future; Expected date: 01/04/2023  -     Lipid Panel; Future; Expected date: 01/04/2023  -     Microalbumin/Creatinine Ratio, Urine; Future; Expected date: 01/04/2023  -     TSH; Future; Expected date: 01/04/2023

## 2023-01-04 NOTE — TELEPHONE ENCOUNTER
----- Message from Jesus Pierson sent at 1/4/2023 10:13 AM CST -----  Regarding: Patient lab location  Patient has an appt on 1/25 pt would like to request lab/blood orders be sent to [facility] instead.

## 2023-01-04 NOTE — TELEPHONE ENCOUNTER
Attempted to contact patient to inform patient that labs orders were sent to [facility]. No answer message left to call clinic back.

## 2023-01-04 NOTE — TELEPHONE ENCOUNTER
Dr Prado are you okay with these labs before I schedule the patient ,please advise. Thank you. Patient requesting labs before visit  with you later this month. Patient wants labs done at [facility] .

## 2023-01-09 ENCOUNTER — PATIENT MESSAGE (OUTPATIENT)
Dept: ADMINISTRATIVE | Facility: HOSPITAL | Age: 63
End: 2023-01-09
Payer: COMMERCIAL

## 2023-01-10 LAB
ALBUMIN SERPL-MCNC: 4.2 G/DL (ref 3.6–5.1)
ALBUMIN/CREAT UR: 2 MCG/MG CREAT
ALBUMIN/GLOB SERPL: 1.4 (CALC) (ref 1–2.5)
ALP SERPL-CCNC: 57 U/L (ref 35–144)
ALT SERPL-CCNC: 20 U/L (ref 9–46)
AST SERPL-CCNC: 18 U/L (ref 10–35)
BILIRUB SERPL-MCNC: 0.4 MG/DL (ref 0.2–1.2)
BUN SERPL-MCNC: 12 MG/DL (ref 7–25)
BUN/CREAT SERPL: ABNORMAL (CALC) (ref 6–22)
CALCIUM SERPL-MCNC: 9.3 MG/DL (ref 8.6–10.3)
CHLORIDE SERPL-SCNC: 106 MMOL/L (ref 98–110)
CHOLEST SERPL-MCNC: 109 MG/DL
CHOLEST/HDLC SERPL: 2.9 (CALC)
CO2 SERPL-SCNC: 25 MMOL/L (ref 20–32)
CREAT SERPL-MCNC: 1.16 MG/DL (ref 0.7–1.35)
CREAT UR-MCNC: 138 MG/DL (ref 20–320)
EGFR: 72 ML/MIN/1.73M2
ERYTHROCYTE [DISTWIDTH] IN BLOOD BY AUTOMATED COUNT: 13.1 % (ref 11–15)
GLOBULIN SER CALC-MCNC: 3 G/DL (CALC) (ref 1.9–3.7)
GLUCOSE SERPL-MCNC: 131 MG/DL (ref 65–99)
HBA1C MFR BLD: 8.4 % OF TOTAL HGB
HCT VFR BLD AUTO: 42.8 % (ref 38.5–50)
HDLC SERPL-MCNC: 38 MG/DL
HGB BLD-MCNC: 14.3 G/DL (ref 13.2–17.1)
LDLC SERPL CALC-MCNC: 48 MG/DL (CALC)
MCH RBC QN AUTO: 29.7 PG (ref 27–33)
MCHC RBC AUTO-ENTMCNC: 33.4 G/DL (ref 32–36)
MCV RBC AUTO: 88.8 FL (ref 80–100)
MICROALBUMIN UR-MCNC: 0.3 MG/DL
NONHDLC SERPL-MCNC: 71 MG/DL (CALC)
PLATELET # BLD AUTO: 342 THOUSAND/UL (ref 140–400)
PMV BLD REES-ECKER: 9.3 FL (ref 7.5–12.5)
POTASSIUM SERPL-SCNC: 4.4 MMOL/L (ref 3.5–5.3)
PROT SERPL-MCNC: 7.2 G/DL (ref 6.1–8.1)
RBC # BLD AUTO: 4.82 MILLION/UL (ref 4.2–5.8)
SODIUM SERPL-SCNC: 138 MMOL/L (ref 135–146)
T3 SERPL-MCNC: 126 NG/DL (ref 76–181)
T4 FREE SERPL-MCNC: 1.1 NG/DL (ref 0.8–1.8)
TRIGL SERPL-MCNC: 157 MG/DL
TSH SERPL-ACNC: 5.6 MIU/L (ref 0.4–4.5)
WBC # BLD AUTO: 7.4 THOUSAND/UL (ref 3.8–10.8)

## 2023-01-26 ENCOUNTER — PATIENT MESSAGE (OUTPATIENT)
Dept: FAMILY MEDICINE | Facility: CLINIC | Age: 63
End: 2023-01-26

## 2023-01-26 ENCOUNTER — OFFICE VISIT (OUTPATIENT)
Dept: FAMILY MEDICINE | Facility: CLINIC | Age: 63
End: 2023-01-26
Payer: COMMERCIAL

## 2023-01-26 VITALS
BODY MASS INDEX: 34.78 KG/M2 | TEMPERATURE: 98 F | HEART RATE: 75 BPM | WEIGHT: 208.75 LBS | HEIGHT: 65 IN | OXYGEN SATURATION: 98 % | DIASTOLIC BLOOD PRESSURE: 70 MMHG | SYSTOLIC BLOOD PRESSURE: 118 MMHG

## 2023-01-26 DIAGNOSIS — E11.9 CONTROLLED TYPE 2 DIABETES MELLITUS WITHOUT COMPLICATION, WITHOUT LONG-TERM CURRENT USE OF INSULIN: ICD-10-CM

## 2023-01-26 DIAGNOSIS — J30.89 NON-SEASONAL ALLERGIC RHINITIS DUE TO OTHER ALLERGIC TRIGGER: ICD-10-CM

## 2023-01-26 DIAGNOSIS — E03.8 OTHER SPECIFIED HYPOTHYROIDISM: ICD-10-CM

## 2023-01-26 DIAGNOSIS — E55.9 VITAMIN D DEFICIENCY: ICD-10-CM

## 2023-01-26 DIAGNOSIS — E11.65 UNCONTROLLED TYPE 2 DIABETES MELLITUS WITH HYPERGLYCEMIA: ICD-10-CM

## 2023-01-26 DIAGNOSIS — E78.5 HYPERLIPIDEMIA, UNSPECIFIED HYPERLIPIDEMIA TYPE: ICD-10-CM

## 2023-01-26 DIAGNOSIS — F17.200 TOBACCO DEPENDENCE: ICD-10-CM

## 2023-01-26 DIAGNOSIS — N52.8 OTHER MALE ERECTILE DYSFUNCTION: ICD-10-CM

## 2023-01-26 DIAGNOSIS — E78.5 HYPERLIPIDEMIA ASSOCIATED WITH TYPE 2 DIABETES MELLITUS: ICD-10-CM

## 2023-01-26 DIAGNOSIS — E11.69 HYPERLIPIDEMIA ASSOCIATED WITH TYPE 2 DIABETES MELLITUS: ICD-10-CM

## 2023-01-26 DIAGNOSIS — Z00.00 ANNUAL PHYSICAL EXAM: Primary | ICD-10-CM

## 2023-01-26 DIAGNOSIS — T78.02XD ANAPHYLACTIC SHOCK DUE TO CRUSTACEANS, SUBSEQUENT ENCOUNTER: ICD-10-CM

## 2023-01-26 DIAGNOSIS — I10 ESSENTIAL HYPERTENSION: ICD-10-CM

## 2023-01-26 PROCEDURE — 3074F PR MOST RECENT SYSTOLIC BLOOD PRESSURE < 130 MM HG: ICD-10-PCS | Mod: CPTII,S$GLB,, | Performed by: FAMILY MEDICINE

## 2023-01-26 PROCEDURE — 3008F BODY MASS INDEX DOCD: CPT | Mod: CPTII,S$GLB,, | Performed by: FAMILY MEDICINE

## 2023-01-26 PROCEDURE — 3074F SYST BP LT 130 MM HG: CPT | Mod: CPTII,S$GLB,, | Performed by: FAMILY MEDICINE

## 2023-01-26 PROCEDURE — 99999 PR PBB SHADOW E&M-EST. PATIENT-LVL III: ICD-10-PCS | Mod: PBBFAC,,, | Performed by: FAMILY MEDICINE

## 2023-01-26 PROCEDURE — 99999 PR PBB SHADOW E&M-EST. PATIENT-LVL III: CPT | Mod: PBBFAC,,, | Performed by: FAMILY MEDICINE

## 2023-01-26 PROCEDURE — 3061F NEG MICROALBUMINURIA REV: CPT | Mod: CPTII,S$GLB,, | Performed by: FAMILY MEDICINE

## 2023-01-26 PROCEDURE — 99396 PREV VISIT EST AGE 40-64: CPT | Mod: S$GLB,,, | Performed by: FAMILY MEDICINE

## 2023-01-26 PROCEDURE — 3066F PR DOCUMENTATION OF TREATMENT FOR NEPHROPATHY: ICD-10-PCS | Mod: CPTII,S$GLB,, | Performed by: FAMILY MEDICINE

## 2023-01-26 PROCEDURE — 3008F PR BODY MASS INDEX (BMI) DOCUMENTED: ICD-10-PCS | Mod: CPTII,S$GLB,, | Performed by: FAMILY MEDICINE

## 2023-01-26 PROCEDURE — 3052F PR MOST RECENT HEMOGLOBIN A1C LEVEL 8.0 - < 9.0%: ICD-10-PCS | Mod: CPTII,S$GLB,, | Performed by: FAMILY MEDICINE

## 2023-01-26 PROCEDURE — 1159F MED LIST DOCD IN RCRD: CPT | Mod: CPTII,S$GLB,, | Performed by: FAMILY MEDICINE

## 2023-01-26 PROCEDURE — 99396 PR PREVENTIVE VISIT,EST,40-64: ICD-10-PCS | Mod: S$GLB,,, | Performed by: FAMILY MEDICINE

## 2023-01-26 PROCEDURE — 1159F PR MEDICATION LIST DOCUMENTED IN MEDICAL RECORD: ICD-10-PCS | Mod: CPTII,S$GLB,, | Performed by: FAMILY MEDICINE

## 2023-01-26 PROCEDURE — 3078F DIAST BP <80 MM HG: CPT | Mod: CPTII,S$GLB,, | Performed by: FAMILY MEDICINE

## 2023-01-26 PROCEDURE — 3066F NEPHROPATHY DOC TX: CPT | Mod: CPTII,S$GLB,, | Performed by: FAMILY MEDICINE

## 2023-01-26 PROCEDURE — 1160F RVW MEDS BY RX/DR IN RCRD: CPT | Mod: CPTII,S$GLB,, | Performed by: FAMILY MEDICINE

## 2023-01-26 PROCEDURE — 1160F PR REVIEW ALL MEDS BY PRESCRIBER/CLIN PHARMACIST DOCUMENTED: ICD-10-PCS | Mod: CPTII,S$GLB,, | Performed by: FAMILY MEDICINE

## 2023-01-26 PROCEDURE — 3061F PR NEG MICROALBUMINURIA RESULT DOCUMENTED/REVIEW: ICD-10-PCS | Mod: CPTII,S$GLB,, | Performed by: FAMILY MEDICINE

## 2023-01-26 PROCEDURE — 3052F HG A1C>EQUAL 8.0%<EQUAL 9.0%: CPT | Mod: CPTII,S$GLB,, | Performed by: FAMILY MEDICINE

## 2023-01-26 PROCEDURE — 3078F PR MOST RECENT DIASTOLIC BLOOD PRESSURE < 80 MM HG: ICD-10-PCS | Mod: CPTII,S$GLB,, | Performed by: FAMILY MEDICINE

## 2023-01-26 RX ORDER — EPINEPHRINE 0.3 MG/.3ML
INJECTION SUBCUTANEOUS
Qty: 2 EACH | Refills: 1 | Status: SHIPPED | OUTPATIENT
Start: 2023-01-26 | End: 2023-01-27 | Stop reason: SDUPTHER

## 2023-01-26 RX ORDER — METFORMIN HYDROCHLORIDE 500 MG/1
1000 TABLET, EXTENDED RELEASE ORAL 2 TIMES DAILY WITH MEALS
Qty: 360 TABLET | Refills: 0 | Status: SHIPPED | OUTPATIENT
Start: 2023-01-26 | End: 2023-01-27 | Stop reason: SDUPTHER

## 2023-01-26 RX ORDER — ERGOCALCIFEROL 1.25 MG/1
50000 CAPSULE ORAL
Qty: 12 CAPSULE | Refills: 3 | Status: SHIPPED | OUTPATIENT
Start: 2023-01-26 | End: 2023-01-27 | Stop reason: SDUPTHER

## 2023-01-26 RX ORDER — AZELASTINE 1 MG/ML
1 SPRAY, METERED NASAL 2 TIMES DAILY
Qty: 30 ML | Refills: 11 | Status: SHIPPED | OUTPATIENT
Start: 2023-01-26 | End: 2023-01-27 | Stop reason: SDUPTHER

## 2023-01-26 RX ORDER — ACETAMINOPHEN AND CODEINE PHOSPHATE 300; 30 MG/1; MG/1
1-2 TABLET ORAL EVERY 6 HOURS PRN
COMMUNITY
Start: 2023-01-22

## 2023-01-26 RX ORDER — AMLODIPINE BESYLATE 10 MG/1
10 TABLET ORAL DAILY
Qty: 90 TABLET | Refills: 3 | Status: SHIPPED | OUTPATIENT
Start: 2023-01-26 | End: 2023-01-27 | Stop reason: SDUPTHER

## 2023-01-26 RX ORDER — TADALAFIL 20 MG/1
20 TABLET ORAL DAILY
Qty: 30 TABLET | Refills: 5 | Status: SHIPPED | OUTPATIENT
Start: 2023-01-26 | End: 2023-01-27 | Stop reason: SDUPTHER

## 2023-01-26 RX ORDER — ATORVASTATIN CALCIUM 20 MG/1
20 TABLET, FILM COATED ORAL DAILY
Qty: 90 TABLET | Refills: 3 | Status: SHIPPED | OUTPATIENT
Start: 2023-01-26 | End: 2023-01-27 | Stop reason: SDUPTHER

## 2023-01-26 RX ORDER — FLUTICASONE PROPIONATE 50 MCG
1 SPRAY, SUSPENSION (ML) NASAL 2 TIMES DAILY
Qty: 48 ML | Refills: 11 | Status: SHIPPED | OUTPATIENT
Start: 2023-01-26 | End: 2023-01-27 | Stop reason: SDUPTHER

## 2023-01-26 RX ORDER — IBUPROFEN 200 MG
1 TABLET ORAL DAILY
Qty: 21 PATCH | Refills: 15 | Status: SHIPPED | OUTPATIENT
Start: 2023-01-26 | End: 2023-01-27 | Stop reason: SDUPTHER

## 2023-01-26 RX ORDER — GLIPIZIDE 10 MG/1
10 TABLET ORAL 2 TIMES DAILY WITH MEALS
Qty: 180 TABLET | Refills: 0 | Status: SHIPPED | OUTPATIENT
Start: 2023-01-26 | End: 2023-01-27 | Stop reason: SDUPTHER

## 2023-01-26 RX ORDER — LEVOTHYROXINE SODIUM 50 UG/1
50 TABLET ORAL
Qty: 90 TABLET | Refills: 0 | Status: SHIPPED | OUTPATIENT
Start: 2023-01-26 | End: 2023-01-27 | Stop reason: SDUPTHER

## 2023-01-26 NOTE — PROGRESS NOTES
"  Physical Exam  /70   Pulse 75   Temp 97.9 °F (36.6 °C) (Oral)   Ht 5' 5" (1.651 m)   Wt 94.7 kg (208 lb 12.4 oz)   SpO2 98%   BMI 34.74 kg/m²      Office Visit    Patient Name: Vijay Rodriguez Jr.    : 1960  MRN: [id number]      Assessment/Plan:  Vijay Rodriguez Jr. is a 62 y.o. male who presents today for :    Annual physical exam  -previous labs reviewed and discussed with patient  -anticipatory guidance provided with age appropriate preventative services discussed, healthy diet and regular physical exercise also discussed with patient    Uncontrolled type 2 diabetes mellitus with hyperglycemia  -     glipiZIDE (GLUCOTROL) 10 MG tablet; Take 1 tablet (10 mg total) by mouth 2 (two) times daily with meals.   -     metFORMIN (GLUCOPHAGE-XR) 500 MG ER 24hr tablet; Take 2 tablets (1,000 mg total) by mouth 2 (two) times daily with meals.   -     Hemoglobin A1C; Future; Expected date: 2023  -     Basic Metabolic Panel; Future; Expected date: 2023  -HLD associated with type 2 DM  -     atorvastatin (LIPITOR) 20 MG tablet; Take 1 tablet (20 mg total) by mouth once daily.  Dispense: 90 tablet; Refill: 3  -previous A1c reviewed:   Lab Results   Component Value Date    HGBA1C 8.4 (H) 2023     -improved but still above goal of A1c <7%, stressed the need to take both medications daily as prescribed for better control. Reviewed with patient about routine diabetic care  -weight loss and regular physical excercise    Hypothyroidism  -     levothyroxine (SYNTHROID) 50 MCG tablet; Take 1 tablet (50 mcg total) by mouth before breakfast.   -     TSH; Future; Expected date: 2023  -     T4, Free; Future; Expected date: 2023  -stable, asymptomatic, stressed the need to take medication daily  -recheck labs in 8 weeks    Tobacco dependence  -     nicotine (NICODERM CQ) 14 mg/24 hr; Place 1 patch onto the skin once daily.  Dispense: 21 patch; Refill: 15    Other male erectile " dysfunction  -     tadalafiL (CIALIS) 20 MG Tab; Take 1 tablet (20 mg total) by mouth once daily.  Dispense: 30 tablet; Refill: 5    Vitamin D deficiency  -     VITAMIN D2 1,250 mcg (50,000 unit) capsule; Take 1 capsule (50,000 Units total) by mouth every 7 days.  Dispense: 12 capsule; Refill: 3        Non-seasonal allergic rhinitis due to other allergic trigger  -     azelastine (ASTELIN) 137 mcg (0.1 %) nasal spray; 1 spray (137 mcg total) by Nasal route 2 (two) times daily.  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 Strength: 0.3 mg/0.3 mL  Dispense: 2 each; Refill: 1          Follow up 3 mo    This note was created by combination of typed  and MModal dictation.  Transcription errors may be present.  If there are any questions, please contact me.        ----------------------------------------------------------------------------------------------------------------------      HPI:  Patient Care Team:  Isidro Prado MD as PCP - General (Family Medicine)  Sasha Coker MA as Care Coordinator    Vijay is a 62 y.o. male with      Patient Active Problem List   Diagnosis    Elbow pain    Essential hypertension    Allergic rhinitis    Stress    Arm numbness left    Other specified hypothyroidism    Obesity (BMI 30-39.9)    Hyperlipidemia    Prediabetes    Tobacco dependence    Tobacco use    Controlled type 2 diabetes mellitus without complication, without long-term current use of insulin    Chronic maxillary sinusitis    Knee pain    Hepatitis C antibody positive in blood       Vijay has PMHx as noted above and presents today for:  Annual Exam      His last Annual checkup with me was over a year ago. He is doing well overall and has no major complaints today. Health maintenance-wise, he is up to date on colon cancer screening. He reports that BP is controlled at home. His  diabetes has improved slightly but A1c is still above goal, which he admits that he doesn't take daily as prescribed. In addition, he also occasional skips his dose of thyroid medication, but denies any fatigue/generalized arthralgias or myalgia/cold intolerance. He had gone to Urgent care over the past weekend to get an I&D for perianal abscness, which is healing well s/p course of antibiotics. Otherwise, no other acute issues during this visit.    Hemoglobin A1C   Date Value Ref Range Status   01/09/2023 8.4 (H) <5.7 % of total Hgb Final     Comment:     For someone without known diabetes, a hemoglobin A1c  value of 6.5% or greater indicates that they may have   diabetes and this should be confirmed with a follow-up   test.     For someone with known diabetes, a value <7% indicates   that their diabetes is well controlled and a value   greater than or equal to 7% indicates suboptimal   control. A1c targets should be individualized based on   duration of diabetes, age, comorbid conditions, and   other considerations.     Currently, no consensus exists regarding use of  hemoglobin A1c for diagnosis of diabetes for children.         03/21/2022 9.6 (H) <5.7 % of total Hgb Final     Comment:     For someone without known diabetes, a hemoglobin A1c  value of 6.5% or greater indicates that they may have   diabetes and this should be confirmed with a follow-up   test.     For someone with known diabetes, a value <7% indicates   that their diabetes is well controlled and a value   greater than or equal to 7% indicates suboptimal   control. A1c targets should be individualized based on   duration of diabetes, age, comorbid conditions, and   other considerations.     Currently, no consensus exists regarding use of  hemoglobin A1c for diagnosis of diabetes for children.         04/09/2021 7.8 (H) <5.7 % of total Hgb Final     Comment:     For someone without known diabetes, a hemoglobin A1c  value of 6.5% or greater indicates  that they may have   diabetes and this should be confirmed with a follow-up   test.     For someone with known diabetes, a value <7% indicates   that their diabetes is well controlled and a value   greater than or equal to 7% indicates suboptimal   control. A1c targets should be individualized based on   duration of diabetes, age, comorbid conditions, and   other considerations.     Currently, no consensus exists regarding use of  hemoglobin A1c for diagnosis of diabetes for children.               Additional ROS    CONST: no fever, no activity change, weight stable.   EYES: no vision change.   ENT: no sore throat. No dysphagia.   CV: no CP with exertion  RESP: no SOB  GI: no N/V/diarrhea/constipation  : no urinary concerns  MSK: no new myalgias or arthralgias.   SKIN: no new rashes  NEURO: no focal deficits.   PSYCH: no new issues.   ENDOCRINE: no polyuria.             Current Medications  Medications reviewed and updated.       Current Outpatient Medications:     acetaminophen-codeine 300-30mg (TYLENOL #3) 300-30 mg Tab, Take 1-2 tablets by mouth every 6 (six) hours as needed., Disp: , Rfl:     albuterol (PROVENTIL/VENTOLIN HFA) 90 mcg/actuation inhaler, Inhale 2 puffs into the lungs., Disp: , Rfl:     amLODIPine (NORVASC) 10 MG tablet, Take 1 tablet (10 mg total) by mouth once daily., Disp: 90 tablet, Rfl: 3    atorvastatin (LIPITOR) 20 MG tablet, Take 1 tablet (20 mg total) by mouth once daily., Disp: 90 tablet, Rfl: 3    azelastine (ASTELIN) 137 mcg (0.1 %) nasal spray, 1 spray (137 mcg total) by Nasal route 2 (two) times daily., Disp: 30 mL, Rfl: 11    EPINEPHrine (EPIPEN) 0.3 mg/0.3 mL AtIn, TAKE 1 AUTO(S) AS NEEDED BY INJECTION ROUTE. Strength: 0.3 mg/0.3 mL, Disp: 2 each, Rfl: 1    fluticasone propionate (FLONASE) 50 mcg/actuation nasal spray, 1 spray (50 mcg total) by Each Nostril route 2 (two) times daily., Disp: 48 mL, Rfl: 11    glipiZIDE (GLUCOTROL) 10 MG tablet, Take 1 tablet (10 mg total) by mouth  2 (two) times daily with meals. Followup Dr.T Prado APRIL 2023 for more refills, call to schedule/get labs 1wk before doctor's appointment (ordered), Disp: 180 tablet, Rfl: 0    levothyroxine (SYNTHROID) 50 MCG tablet, Take 1 tablet (50 mcg total) by mouth before breakfast. Followup Dr.T Prado APRIL 2023 for more refills, call to schedule/get labs 1wk before doctor's appointment (ordered), Disp: 90 tablet, Rfl: 0    metFORMIN (GLUCOPHAGE-XR) 500 MG ER 24hr tablet, Take 2 tablets (1,000 mg total) by mouth 2 (two) times daily with meals. Followup Dr.T Prado APRIL 2023 for more refills, call to schedule/get labs 1wk before doctor's appointment (ordered), Disp: 360 tablet, Rfl: 0    nicotine (NICODERM CQ) 14 mg/24 hr, Place 1 patch onto the skin once daily., Disp: 21 patch, Rfl: 15    ranitidine (ZANTAC) 300 MG tablet, Take 1 tablet by mouth., Disp: , Rfl:     tadalafiL (CIALIS) 20 MG Tab, Take 1 tablet (20 mg total) by mouth once daily., Disp: 30 tablet, Rfl: 5    VITAMIN D2 1,250 mcg (50,000 unit) capsule, Take 1 capsule (50,000 Units total) by mouth every 7 days., Disp: 12 capsule, Rfl: 3    Past Surgical History:   Procedure Laterality Date    PROSTATE SURGERY  2007       Family History   Problem Relation Age of Onset    Cancer Mother 60        colon    Hypertension Mother     Cancer Father 60        prostate    Diabetes Father     Hypertension Father     Hypertension Brother     Cancer Brother 52        pancreatic       Social History     Socioeconomic History    Marital status:    Occupational History     Employer: [email or web address]   Tobacco Use    Smoking status: Every Day     Packs/day: 0.25     Years: 30.00     Pack years: 7.50     Types: Cigarettes   Substance and Sexual Activity    Alcohol use: Yes     Comment: 6 pk beer per week    Drug use: No           Allergies   Review of patient's allergies indicates:   Allergen Reactions    Hymenoptera allergenic extract Anaphylaxis    Insects extract     Lisinopril  "Other (See Comments)     cough    Shellfish containing products Hives             Review of Systems  See HPI      [unfilled]  /70   Pulse 75   Temp 97.9 °F (36.6 °C) (Oral)   Ht 5' 5" (1.651 m)   Wt 94.7 kg (208 lb 12.4 oz)   SpO2 98%   BMI 34.74 kg/m²     GEN: NAD, well developed, pleasant, well nourished  HEENT: NCAT, PERRLA, EOMI, sclera clear, anicteric, bilateral ear exam wnl, O/P clear, MMM with no lesions  NECK: normal, supple with midline trachea, no LAD, no thyromegaly  LUNGS: CTAB, no w/r/r, no increased work of breathing   HEART: RRR, normal S1 and S2, no m/r/g, no edema  ABD: s/nt/nd, NABS  SKIN: normal turgor, no rashes  PSYCH: AOx3, appropriate mood and affect  MSK: warm/well perfused, normal ROM in all extremities, no c/c/e.  NEURO: normal without focal findings, CN II-XII are grossly intact.  Sensation/strength grossly normal, gait and station normal.         Labs  Lab Results   Component Value Date    HGBA1C 8.4 (H) 01/09/2023     Lab Results   Component Value Date     01/09/2023    K 4.4 01/09/2023     01/09/2023    CO2 25 01/09/2023    BUN 12 01/09/2023    CREATININE 1.16 01/09/2023    CALCIUM 9.3 01/09/2023    ANIONGAP 7 (L) 11/13/2017    ESTGFRAFRICA 89 03/21/2022    EGFRNONAA 77 03/21/2022     Lab Results   Component Value Date    CHOL 109 01/09/2023    CHOL 143 03/21/2022    CHOL 94 04/09/2021     Lab Results   Component Value Date    HDL 38 (L) 01/09/2023    HDL 37 (L) 03/21/2022    HDL 35 (L) 04/09/2021     Lab Results   Component Value Date    LDLCALC 48 01/09/2023    LDLCALC 82 03/21/2022    LDLCALC 42 04/09/2021     Lab Results   Component Value Date    TRIG 157 (H) 01/09/2023    TRIG 138 03/21/2022    TRIG 89 04/09/2021     Lab Results   Component Value Date    CHOLHDL 2.9 01/09/2023    CHOLHDL 3.9 03/21/2022    CHOLHDL 2.7 04/09/2021     Last set of blood work has been reviewed as noted above.                "

## 2023-03-14 ENCOUNTER — PATIENT OUTREACH (OUTPATIENT)
Dept: ADMINISTRATIVE | Facility: HOSPITAL | Age: 63
End: 2023-03-14
Payer: COMMERCIAL

## 2023-03-31 ENCOUNTER — PATIENT OUTREACH (OUTPATIENT)
Dept: ADMINISTRATIVE | Facility: HOSPITAL | Age: 63
End: 2023-03-31
Payer: COMMERCIAL

## 2023-04-12 ENCOUNTER — PATIENT MESSAGE (OUTPATIENT)
Dept: ADMINISTRATIVE | Facility: HOSPITAL | Age: 63
End: 2023-04-12
Payer: COMMERCIAL

## 2023-04-20 ENCOUNTER — PATIENT OUTREACH (OUTPATIENT)
Dept: ADMINISTRATIVE | Facility: HOSPITAL | Age: 63
End: 2023-04-20
Payer: COMMERCIAL

## 2023-05-18 ENCOUNTER — PATIENT MESSAGE (OUTPATIENT)
Dept: ADMINISTRATIVE | Facility: HOSPITAL | Age: 63
End: 2023-05-18
Payer: COMMERCIAL

## 2023-05-29 ENCOUNTER — PATIENT MESSAGE (OUTPATIENT)
Dept: ADMINISTRATIVE | Facility: HOSPITAL | Age: 63
End: 2023-05-29
Payer: COMMERCIAL

## 2023-06-13 ENCOUNTER — OFFICE VISIT (OUTPATIENT)
Dept: FAMILY MEDICINE | Facility: CLINIC | Age: 63
End: 2023-06-13
Payer: COMMERCIAL

## 2023-06-13 DIAGNOSIS — E66.9 OBESITY (BMI 30-39.9): ICD-10-CM

## 2023-06-13 DIAGNOSIS — E11.65 UNCONTROLLED TYPE 2 DIABETES MELLITUS WITH HYPERGLYCEMIA: ICD-10-CM

## 2023-06-13 DIAGNOSIS — I10 ESSENTIAL HYPERTENSION: ICD-10-CM

## 2023-06-13 DIAGNOSIS — E03.8 OTHER SPECIFIED HYPOTHYROIDISM: ICD-10-CM

## 2023-06-13 DIAGNOSIS — E11.9 CONTROLLED TYPE 2 DIABETES MELLITUS WITHOUT COMPLICATION, WITHOUT LONG-TERM CURRENT USE OF INSULIN: Primary | ICD-10-CM

## 2023-06-13 PROCEDURE — 3061F PR NEG MICROALBUMINURIA RESULT DOCUMENTED/REVIEW: ICD-10-PCS | Mod: CPTII,95,, | Performed by: FAMILY MEDICINE

## 2023-06-13 PROCEDURE — 99214 PR OFFICE/OUTPT VISIT, EST, LEVL IV, 30-39 MIN: ICD-10-PCS | Mod: 95,,, | Performed by: FAMILY MEDICINE

## 2023-06-13 PROCEDURE — 99214 OFFICE O/P EST MOD 30 MIN: CPT | Mod: 95,,, | Performed by: FAMILY MEDICINE

## 2023-06-13 PROCEDURE — 1160F PR REVIEW ALL MEDS BY PRESCRIBER/CLIN PHARMACIST DOCUMENTED: ICD-10-PCS | Mod: CPTII,95,, | Performed by: FAMILY MEDICINE

## 2023-06-13 PROCEDURE — 1159F PR MEDICATION LIST DOCUMENTED IN MEDICAL RECORD: ICD-10-PCS | Mod: CPTII,95,, | Performed by: FAMILY MEDICINE

## 2023-06-13 PROCEDURE — 1160F RVW MEDS BY RX/DR IN RCRD: CPT | Mod: CPTII,95,, | Performed by: FAMILY MEDICINE

## 2023-06-13 PROCEDURE — 3052F HG A1C>EQUAL 8.0%<EQUAL 9.0%: CPT | Mod: CPTII,95,, | Performed by: FAMILY MEDICINE

## 2023-06-13 PROCEDURE — 3066F PR DOCUMENTATION OF TREATMENT FOR NEPHROPATHY: ICD-10-PCS | Mod: CPTII,95,, | Performed by: FAMILY MEDICINE

## 2023-06-13 PROCEDURE — 3061F NEG MICROALBUMINURIA REV: CPT | Mod: CPTII,95,, | Performed by: FAMILY MEDICINE

## 2023-06-13 PROCEDURE — 1159F MED LIST DOCD IN RCRD: CPT | Mod: CPTII,95,, | Performed by: FAMILY MEDICINE

## 2023-06-13 PROCEDURE — 3052F PR MOST RECENT HEMOGLOBIN A1C LEVEL 8.0 - < 9.0%: ICD-10-PCS | Mod: CPTII,95,, | Performed by: FAMILY MEDICINE

## 2023-06-13 PROCEDURE — 3066F NEPHROPATHY DOC TX: CPT | Mod: CPTII,95,, | Performed by: FAMILY MEDICINE

## 2023-06-13 RX ORDER — METFORMIN HYDROCHLORIDE 500 MG/1
1000 TABLET, EXTENDED RELEASE ORAL 2 TIMES DAILY WITH MEALS
Qty: 360 TABLET | Refills: 0 | Status: SHIPPED | OUTPATIENT
Start: 2023-06-13 | End: 2023-06-19 | Stop reason: SDUPTHER

## 2023-06-13 RX ORDER — LEVOTHYROXINE SODIUM 50 UG/1
50 TABLET ORAL
Qty: 10 TABLET | Refills: 0 | Status: SHIPPED | OUTPATIENT
Start: 2023-06-13 | End: 2023-06-19 | Stop reason: SDUPTHER

## 2023-06-13 NOTE — PROGRESS NOTES
Established Patient - Audio Only Telehealth Visit    The patient location is: home  The chief complaint leading to consultation is: Diabetes    Visit type: Virtual visit with audio only (telephone) - patient verbally consented to an audio visit today prior to this telephone encounter.  The reason for the audio only service rather than synchronous audio and video virtual visit was related to technical difficulties or patient preference/necessity.  Total time spent with patient: 22 minutes  Each patient to whom he or she provides medical services by telemedicine is:  (1) informed of the relationship between the physician and patient and the respective role of any other health care provider with respect to management of the patient; and (2) notified that he or she may decline to receive medical services by telemedicine and may withdraw from such care at any time.           This service was not originating from a related E/M service provided within the previous 7 days nor will  to an E/M service or procedure within the next 24 hours or my soonest available appointment.  Prevailing standard of care was able to be met in this audio-only visit.           Office Visit    Patient Name: Vijay Rodriguez Jr.    : 1960  MRN: [id number]      Assessment/Plan:  Vijay Rodriguez Jr. is a 62 y.o. male who presents today for :    Uncontrolled type 2 diabetes mellitus with hyperglycemia  -     metFORMIN (GLUCOPHAGE-XR) 500 MG ER 24hr tablet; Take 2 tablets (1,000 mg total) by mouth 2 (two) times daily with meals.   -Obesity (BMI 30-39.9)  -previous A1c reviewed:   Lab Results   Component Value Date    HGBA1C 8.4 (H) 2023     -previously uncontrolled - due for repeat A1c - continue current medication regimen and adjust medication as needed.  -weight loss and regular physical excercise    -Hypothyroidism  -     levothyroxine (SYNTHROID) 50 MCG tablet; Take 1 tablet (50 mcg total) by mouth before breakfast.   Dispense: 10 tablet; Refill: 0  -recheck labs    -HTN  -continue current medication regimen  -DASH diet, regular cardiovascular exercises  -weight loss        Follow up 3 months    This note was created by combination of typed  and MModal dictation.  Transcription errors may be present.  If there are any questions, please contact me.      ----------------------------------------------------------------------------------------------------------------------      HPI:  Patient Care Team:  Isidro Prado MD as PCP - General (Family Medicine)  Sasha Coker MA as Care Coordinator  Jw Roberts MD as Consulting Physician (Ophthalmology)    Vijay is a 62 y.o. male with      Patient Active Problem List   Diagnosis    Elbow pain    -HTN    Allergic rhinitis    Stress    Arm numbness left    -Hypothyroidism    -Obesity (BMI 30-39.9)    -Tobacco use    -Controlled type 2 diabetes mellitus without complication, without long-term current use of insulin    Chronic maxillary sinusitis    Knee pain    Hepatitis C antibody positive in blood       Patient presents today for f/u of:  Diabetes        DM - patient is compliant with Glipizide and Metformin, no side effects - he is due for repeat A1c as his previous A1c was above goal, with daily FBG below 130. BP controlled on current regimen.         Hemoglobin A1C   Date Value Ref Range Status   01/09/2023 8.4 (H) <5.7 % of total Hgb Final     Comment:     For someone without known diabetes, a hemoglobin A1c  value of 6.5% or greater indicates that they may have   diabetes and this should be confirmed with a follow-up   test.     For someone with known diabetes, a value <7% indicates   that their diabetes is well controlled and a value   greater than or equal to 7% indicates suboptimal   control. A1c targets should be individualized based on   duration of diabetes, age, comorbid conditions, and   other considerations.     Currently, no consensus exists regarding  use of  hemoglobin A1c for diagnosis of diabetes for children.         03/21/2022 9.6 (H) <5.7 % of total Hgb Final     Comment:     For someone without known diabetes, a hemoglobin A1c  value of 6.5% or greater indicates that they may have   diabetes and this should be confirmed with a follow-up   test.     For someone with known diabetes, a value <7% indicates   that their diabetes is well controlled and a value   greater than or equal to 7% indicates suboptimal   control. A1c targets should be individualized based on   duration of diabetes, age, comorbid conditions, and   other considerations.     Currently, no consensus exists regarding use of  hemoglobin A1c for diagnosis of diabetes for children.         04/09/2021 7.8 (H) <5.7 % of total Hgb Final     Comment:     For someone without known diabetes, a hemoglobin A1c  value of 6.5% or greater indicates that they may have   diabetes and this should be confirmed with a follow-up   test.     For someone with known diabetes, a value <7% indicates   that their diabetes is well controlled and a value   greater than or equal to 7% indicates suboptimal   control. A1c targets should be individualized based on   duration of diabetes, age, comorbid conditions, and   other considerations.     Currently, no consensus exists regarding use of  hemoglobin A1c for diagnosis of diabetes for children.               Hypothyroidism - tolerating medication well, no side effects. Needs refills on Levothyroxine 50 mcg - due for repeat labwork. Otherwise, no other acute issues during this visit.    Last TSH   Lab Results   Component Value Date    TSH 5.60 (H) 01/09/2023         Additional ROS    Negative review of system  except per HPI               Patient Active Problem List   Diagnosis    Elbow pain    -HTN    Allergic rhinitis    Stress    Arm numbness left    -Hypothyroidism    -Obesity (BMI 30-39.9)    -Tobacco use    -Controlled type 2 diabetes mellitus without complication,  without long-term current use of insulin    Chronic maxillary sinusitis    Knee pain    Hepatitis C antibody positive in blood       Current Medications  Medications reviewed/updated.     Current Outpatient Medications on File Prior to Visit   Medication Sig Dispense Refill    acetaminophen-codeine 300-30mg (TYLENOL #3) 300-30 mg Tab Take 1-2 tablets by mouth every 6 (six) hours as needed.      albuterol (PROVENTIL/VENTOLIN HFA) 90 mcg/actuation inhaler Inhale 2 puffs into the lungs.      amLODIPine (NORVASC) 10 MG tablet Take 1 tablet (10 mg total) by mouth once daily. 90 tablet 3    atorvastatin (LIPITOR) 20 MG tablet Take 1 tablet (20 mg total) by mouth once daily. 90 tablet 3    azelastine (ASTELIN) 137 mcg (0.1 %) nasal spray 1 spray (137 mcg total) by Nasal route 2 (two) times daily. 30 mL 11    EPINEPHrine (EPIPEN) 0.3 mg/0.3 mL AtIn TAKE 1 AUTO(S) AS NEEDED BY INJECTION ROUTE. Strength: 0.3 mg/0.3 mL 2 each 1    fluticasone propionate (FLONASE) 50 mcg/actuation nasal spray 1 spray (50 mcg total) by Each Nostril route 2 (two) times daily. 48 mL 11    glipiZIDE (GLUCOTROL) 10 MG tablet TAKE 1 TABLET BY MOUTH TWICE DAILY WITH MEALS 180 tablet 0    nicotine (NICODERM CQ) 14 mg/24 hr Place 1 patch onto the skin once daily. 21 patch 15    ranitidine (ZANTAC) 300 MG tablet Take 1 tablet by mouth.      tadalafiL (CIALIS) 20 MG Tab Take 1 tablet (20 mg total) by mouth once daily. 30 tablet 5    VITAMIN D2 1,250 mcg (50,000 unit) capsule Take 1 capsule (50,000 Units total) by mouth every 7 days. 12 capsule 3    [DISCONTINUED] levothyroxine (SYNTHROID) 50 MCG tablet Take 1 tablet (50 mcg total) by mouth before breakfast. Followup Dr.T Prado APRIL 2023 for more refills, call to schedule/get labs 1wk before doctor's appointment (ordered) 90 tablet 0    [DISCONTINUED] metFORMIN (GLUCOPHAGE-XR) 500 MG ER 24hr tablet Take 2 tablets (1,000 mg total) by mouth 2 (two) times daily with meals. Followup Dr.T Prado APRIL 2023  for more refills, call to schedule/get labs 1wk before doctor's appointment (ordered) 360 tablet 0     No current facility-administered medications on file prior to visit.           Past Surgical History:   Procedure Laterality Date    PROSTATE SURGERY  2007       Family History   Problem Relation Age of Onset    Cancer Mother 60        colon    Hypertension Mother     Cancer Father 60        prostate    Diabetes Father     Hypertension Father     Hypertension Brother     Cancer Brother 52        pancreatic       Social History     Socioeconomic History    Marital status:    Occupational History     Employer: [employer]   Tobacco Use    Smoking status: Every Day     Packs/day: 0.25     Years: 30.00     Pack years: 7.50     Types: Cigarettes   Substance and Sexual Activity    Alcohol use: Yes     Comment: 6 pk beer per week    Drug use: No             Allergies   Review of patient's allergies indicates:   Allergen Reactions    Hymenoptera allergenic extract Anaphylaxis    Insects extract     Lisinopril Other (See Comments)     cough    Shellfish containing products Hives             Review of Systems  See HPI      Physical Exam  There were no vitals taken for this visit.

## 2023-06-16 LAB
BUN SERPL-MCNC: 13 MG/DL (ref 7–25)
BUN/CREAT SERPL: ABNORMAL (CALC) (ref 6–22)
CALCIUM SERPL-MCNC: 9.1 MG/DL (ref 8.6–10.3)
CHLORIDE SERPL-SCNC: 103 MMOL/L (ref 98–110)
CO2 SERPL-SCNC: 22 MMOL/L (ref 20–32)
CREAT SERPL-MCNC: 1.05 MG/DL (ref 0.7–1.35)
EGFR: 81 ML/MIN/1.73M2
GLUCOSE SERPL-MCNC: 141 MG/DL (ref 65–99)
HBA1C MFR BLD: 7.3 % OF TOTAL HGB
POTASSIUM SERPL-SCNC: 3.6 MMOL/L (ref 3.5–5.3)
SODIUM SERPL-SCNC: 136 MMOL/L (ref 135–146)
T4 FREE SERPL-MCNC: 1.3 NG/DL (ref 0.8–1.8)
TSH SERPL-ACNC: 6.22 MIU/L (ref 0.4–4.5)

## 2023-06-19 ENCOUNTER — PATIENT OUTREACH (OUTPATIENT)
Dept: ADMINISTRATIVE | Facility: HOSPITAL | Age: 63
End: 2023-06-19
Payer: COMMERCIAL

## 2023-06-19 ENCOUNTER — CLINICAL SUPPORT (OUTPATIENT)
Dept: OPHTHALMOLOGY | Facility: CLINIC | Age: 63
End: 2023-06-19
Attending: FAMILY MEDICINE
Payer: COMMERCIAL

## 2023-06-19 ENCOUNTER — OFFICE VISIT (OUTPATIENT)
Dept: FAMILY MEDICINE | Facility: CLINIC | Age: 63
End: 2023-06-19
Payer: COMMERCIAL

## 2023-06-19 VITALS
HEIGHT: 65 IN | DIASTOLIC BLOOD PRESSURE: 68 MMHG | TEMPERATURE: 98 F | WEIGHT: 200.81 LBS | SYSTOLIC BLOOD PRESSURE: 120 MMHG | HEART RATE: 80 BPM | OXYGEN SATURATION: 99 % | BODY MASS INDEX: 33.46 KG/M2

## 2023-06-19 DIAGNOSIS — N52.8 OTHER MALE ERECTILE DYSFUNCTION: ICD-10-CM

## 2023-06-19 DIAGNOSIS — E55.9 VITAMIN D DEFICIENCY: ICD-10-CM

## 2023-06-19 DIAGNOSIS — E11.9 CONTROLLED TYPE 2 DIABETES MELLITUS WITHOUT COMPLICATION, WITHOUT LONG-TERM CURRENT USE OF INSULIN: Primary | ICD-10-CM

## 2023-06-19 DIAGNOSIS — J30.89 NON-SEASONAL ALLERGIC RHINITIS DUE TO OTHER ALLERGIC TRIGGER: ICD-10-CM

## 2023-06-19 DIAGNOSIS — E11.9 TYPE 2 DIABETES MELLITUS WITHOUT COMPLICATION, UNSPECIFIED WHETHER LONG TERM INSULIN USE: ICD-10-CM

## 2023-06-19 DIAGNOSIS — E11.69 HYPERLIPIDEMIA ASSOCIATED WITH TYPE 2 DIABETES MELLITUS: ICD-10-CM

## 2023-06-19 DIAGNOSIS — I10 ESSENTIAL HYPERTENSION: ICD-10-CM

## 2023-06-19 DIAGNOSIS — E66.9 OBESITY (BMI 30-39.9): ICD-10-CM

## 2023-06-19 DIAGNOSIS — E03.8 OTHER SPECIFIED HYPOTHYROIDISM: ICD-10-CM

## 2023-06-19 DIAGNOSIS — Z72.0 TOBACCO USE: ICD-10-CM

## 2023-06-19 DIAGNOSIS — E78.5 HYPERLIPIDEMIA ASSOCIATED WITH TYPE 2 DIABETES MELLITUS: ICD-10-CM

## 2023-06-19 PROCEDURE — 1159F MED LIST DOCD IN RCRD: CPT | Mod: CPTII,S$GLB,, | Performed by: FAMILY MEDICINE

## 2023-06-19 PROCEDURE — 1160F RVW MEDS BY RX/DR IN RCRD: CPT | Mod: CPTII,S$GLB,, | Performed by: FAMILY MEDICINE

## 2023-06-19 PROCEDURE — 92228 DIABETIC EYE SCREENING PHOTO: ICD-10-PCS | Mod: TC,S$GLB,, | Performed by: FAMILY MEDICINE

## 2023-06-19 PROCEDURE — 92228 IMG RTA DETC/MNTR DS PHY/QHP: CPT | Mod: TC,S$GLB,, | Performed by: FAMILY MEDICINE

## 2023-06-19 PROCEDURE — 3074F PR MOST RECENT SYSTOLIC BLOOD PRESSURE < 130 MM HG: ICD-10-PCS | Mod: CPTII,S$GLB,, | Performed by: FAMILY MEDICINE

## 2023-06-19 PROCEDURE — 1160F PR REVIEW ALL MEDS BY PRESCRIBER/CLIN PHARMACIST DOCUMENTED: ICD-10-PCS | Mod: CPTII,S$GLB,, | Performed by: FAMILY MEDICINE

## 2023-06-19 PROCEDURE — 1159F PR MEDICATION LIST DOCUMENTED IN MEDICAL RECORD: ICD-10-PCS | Mod: CPTII,S$GLB,, | Performed by: FAMILY MEDICINE

## 2023-06-19 PROCEDURE — 3074F SYST BP LT 130 MM HG: CPT | Mod: CPTII,S$GLB,, | Performed by: FAMILY MEDICINE

## 2023-06-19 PROCEDURE — 92228 IMG RTA DETC/MNTR DS PHY/QHP: CPT | Mod: 26,S$GLB,, | Performed by: OPTOMETRIST

## 2023-06-19 PROCEDURE — 3078F PR MOST RECENT DIASTOLIC BLOOD PRESSURE < 80 MM HG: ICD-10-PCS | Mod: CPTII,S$GLB,, | Performed by: FAMILY MEDICINE

## 2023-06-19 PROCEDURE — 3066F NEPHROPATHY DOC TX: CPT | Mod: CPTII,S$GLB,, | Performed by: FAMILY MEDICINE

## 2023-06-19 PROCEDURE — 99999 PR PBB SHADOW E&M-EST. PATIENT-LVL III: ICD-10-PCS | Mod: PBBFAC,,, | Performed by: FAMILY MEDICINE

## 2023-06-19 PROCEDURE — 3008F BODY MASS INDEX DOCD: CPT | Mod: CPTII,S$GLB,, | Performed by: FAMILY MEDICINE

## 2023-06-19 PROCEDURE — 3066F PR DOCUMENTATION OF TREATMENT FOR NEPHROPATHY: ICD-10-PCS | Mod: CPTII,S$GLB,, | Performed by: FAMILY MEDICINE

## 2023-06-19 PROCEDURE — 3061F NEG MICROALBUMINURIA REV: CPT | Mod: CPTII,S$GLB,, | Performed by: FAMILY MEDICINE

## 2023-06-19 PROCEDURE — 99214 OFFICE O/P EST MOD 30 MIN: CPT | Mod: S$GLB,,, | Performed by: FAMILY MEDICINE

## 2023-06-19 PROCEDURE — 3051F HG A1C>EQUAL 7.0%<8.0%: CPT | Mod: CPTII,S$GLB,, | Performed by: FAMILY MEDICINE

## 2023-06-19 PROCEDURE — 3078F DIAST BP <80 MM HG: CPT | Mod: CPTII,S$GLB,, | Performed by: FAMILY MEDICINE

## 2023-06-19 PROCEDURE — 3051F PR MOST RECENT HEMOGLOBIN A1C LEVEL 7.0 - < 8.0%: ICD-10-PCS | Mod: CPTII,S$GLB,, | Performed by: FAMILY MEDICINE

## 2023-06-19 PROCEDURE — 92228 DIABETIC EYE SCREENING PHOTO: ICD-10-PCS | Mod: 26,S$GLB,, | Performed by: OPTOMETRIST

## 2023-06-19 PROCEDURE — 99999 PR PBB SHADOW E&M-EST. PATIENT-LVL III: CPT | Mod: PBBFAC,,, | Performed by: FAMILY MEDICINE

## 2023-06-19 PROCEDURE — 3061F PR NEG MICROALBUMINURIA RESULT DOCUMENTED/REVIEW: ICD-10-PCS | Mod: CPTII,S$GLB,, | Performed by: FAMILY MEDICINE

## 2023-06-19 PROCEDURE — 3008F PR BODY MASS INDEX (BMI) DOCUMENTED: ICD-10-PCS | Mod: CPTII,S$GLB,, | Performed by: FAMILY MEDICINE

## 2023-06-19 PROCEDURE — 99214 PR OFFICE/OUTPT VISIT, EST, LEVL IV, 30-39 MIN: ICD-10-PCS | Mod: S$GLB,,, | Performed by: FAMILY MEDICINE

## 2023-06-19 RX ORDER — FLUTICASONE PROPIONATE 50 MCG
1 SPRAY, SUSPENSION (ML) NASAL 2 TIMES DAILY
Qty: 48 ML | Refills: 11 | Status: SHIPPED | OUTPATIENT
Start: 2023-06-19

## 2023-06-19 RX ORDER — AMLODIPINE BESYLATE 10 MG/1
10 TABLET ORAL DAILY
Qty: 90 TABLET | Refills: 1 | Status: SHIPPED | OUTPATIENT
Start: 2023-06-19

## 2023-06-19 RX ORDER — LEVOTHYROXINE SODIUM 50 UG/1
50 TABLET ORAL
Qty: 90 TABLET | Refills: 1 | Status: SHIPPED | OUTPATIENT
Start: 2023-06-19 | End: 2024-06-18

## 2023-06-19 RX ORDER — AZELASTINE 1 MG/ML
1 SPRAY, METERED NASAL 2 TIMES DAILY
Qty: 30 ML | Refills: 11 | Status: SHIPPED | OUTPATIENT
Start: 2023-06-19

## 2023-06-19 RX ORDER — ERGOCALCIFEROL 1.25 MG/1
50000 CAPSULE ORAL
Qty: 12 CAPSULE | Refills: 3 | Status: SHIPPED | OUTPATIENT
Start: 2023-06-19

## 2023-06-19 RX ORDER — METFORMIN HYDROCHLORIDE 500 MG/1
1000 TABLET, EXTENDED RELEASE ORAL 2 TIMES DAILY WITH MEALS
Qty: 360 TABLET | Refills: 1 | Status: SHIPPED | OUTPATIENT
Start: 2023-06-19

## 2023-06-19 RX ORDER — GLIPIZIDE 10 MG/1
10 TABLET ORAL 2 TIMES DAILY WITH MEALS
Qty: 180 TABLET | Refills: 1 | Status: SHIPPED | OUTPATIENT
Start: 2023-06-19

## 2023-06-19 RX ORDER — TADALAFIL 20 MG/1
20 TABLET ORAL DAILY
Qty: 30 TABLET | Refills: 5 | Status: SHIPPED | OUTPATIENT
Start: 2023-06-19

## 2023-06-19 NOTE — PROGRESS NOTES
BOOKED EYECAM TO FOLLOW PCP APPT.  Health Maintenance Due   Topic Date Due    Eye Exam  05/03/2020    Pneumococcal Vaccines (Age 0-64) (2 - PCV) 05/21/2021    Foot Exam  08/27/2022    COVID-19 Vaccine (5 - Moderna series) 01/05/2023

## 2023-06-19 NOTE — PROGRESS NOTES
HPI    61 y/o male here for diabetic retinopathy screening with non-dilated   fundus photos per Dr. Prado  Last edited by Kristine James MA on 6/19/2023  3:55 PM.            Assessment /Plan     For exam results, see Encounter Report.    Type 2 diabetes mellitus without complication, unspecified whether long term insulin use  -     Diabetic Eye Screening Photo      Please see Dr. Morgan progress notes for interpretation

## 2024-03-14 ENCOUNTER — PATIENT MESSAGE (OUTPATIENT)
Dept: ADMINISTRATIVE | Facility: HOSPITAL | Age: 64
End: 2024-03-14
Payer: COMMERCIAL

## 2024-05-08 DIAGNOSIS — E55.9 VITAMIN D DEFICIENCY: ICD-10-CM

## 2024-05-08 NOTE — TELEPHONE ENCOUNTER
No care due was identified.  [facility] Embedded Care Due Messages. Reference number: [id number].   5/08/2024 7:01:07 AM CDT

## 2024-05-08 NOTE — TELEPHONE ENCOUNTER
Refill Routing Note   Medication(s) are not appropriate for processing by [facility] for the following reason(s):        Outside of protocol    [facility] action(s):  Route               Appointments  past 12m or future 3m with PCP    Date Provider   Last Visit   6/19/2023 Isidro Prado MD   Next Visit   Visit date not found Isidro Prado MD   ED visits in past 90 days: 0        Note composed:6:40 PM 05/08/2024

## 2024-05-09 NOTE — TELEPHONE ENCOUNTER
Called the Patient to schedule for an OV with Dr. Prado late morning today, or Friday around 10:20am or 10:40am.  Await a response.

## 2024-05-09 NOTE — TELEPHONE ENCOUNTER
Called to the patient to scheduled a visit with Dr. STEPHANY Prado.  Requested a call back to be scheduled.

## 2024-05-21 RX ORDER — ERGOCALCIFEROL 1.25 MG/1
50000 CAPSULE ORAL
Qty: 12 CAPSULE | Refills: 0 | OUTPATIENT
Start: 2024-05-21

## 2024-05-30 ENCOUNTER — TELEPHONE (OUTPATIENT)
Dept: PHARMACY | Facility: CLINIC | Age: 64
End: 2024-05-30
Payer: COMMERCIAL

## 2024-05-30 NOTE — TELEPHONE ENCOUNTER
Contacted the patient to perform Medication Therapy Management review, specifically in reference to refilling glipizide to improve PDC for HEDIS measurements.   Waiting for patient response to verify

## 2024-06-12 NOTE — TELEPHONE ENCOUNTER
Closing encounter after initial inquiry to patient to perform Medication Therapy Management to improve PDC for HEDIS measurements.   Sent patient Yuntaahart message, but no response received.

## 2024-06-13 ENCOUNTER — PATIENT MESSAGE (OUTPATIENT)
Dept: ADMINISTRATIVE | Facility: HOSPITAL | Age: 64
End: 2024-06-13
Payer: COMMERCIAL

## 2024-06-16 DIAGNOSIS — E03.8 OTHER SPECIFIED HYPOTHYROIDISM: ICD-10-CM

## 2024-06-16 DIAGNOSIS — E11.9 CONTROLLED TYPE 2 DIABETES MELLITUS WITHOUT COMPLICATION, WITHOUT LONG-TERM CURRENT USE OF INSULIN: ICD-10-CM

## 2024-06-16 DIAGNOSIS — E55.9 VITAMIN D DEFICIENCY: ICD-10-CM

## 2024-06-16 NOTE — TELEPHONE ENCOUNTER
Care Due:                  Date            Visit Type   Department     Provider  --------------------------------------------------------------------------------                                [facility]                              PRIMARY      MED/ INTERNAL  Last Visit: 06-      CARE ([facility])   MED/ PEDS      Isidro Prado  Next Visit: None Scheduled  None         None Found                                                            Last  Test          Frequency    Reason                     Performed    Due Date  --------------------------------------------------------------------------------    Office Visit  12 months..  VITAMIN, amLODIPine,       06- 06-                             atorvastatin, azelastine,                             glipiZIDE, levothyroxine,                             metFORMIN, nicotine,                             tadalafiL................    CMP.........  12 months..  VITAMIN, atorvastatin,     01- 01-                             glipiZIDE, metFORMIN.....    HBA1C.......  6 months...  glipiZIDE, metFORMIN.....  06- 12-    Lipid Panel.  12 months..  atorvastatin.............  01- 01-    TSH.........  12 months..  levothyroxine............  06-   06-    Vitamin D...  12 months..  VITAMIN..................  Not Found    Overdue    [employer] Embedded Care Due Messages. Reference number: [id number].   6/16/2024 11:21:39 AM CDT

## 2024-06-16 NOTE — TELEPHONE ENCOUNTER
No care due was identified.  [facility] Embedded Care Due Messages. Reference number: [id number].   6/16/2024 11:21:58 AM CDT

## 2024-06-17 ENCOUNTER — TELEPHONE (OUTPATIENT)
Dept: FAMILY MEDICINE | Facility: CLINIC | Age: 64
End: 2024-06-17
Payer: COMMERCIAL

## 2024-06-17 RX ORDER — GLIPIZIDE 10 MG/1
10 TABLET ORAL 2 TIMES DAILY WITH MEALS
Qty: 180 TABLET | Refills: 0 | OUTPATIENT
Start: 2024-06-17

## 2024-06-17 NOTE — TELEPHONE ENCOUNTER
Refill Routing Note   Medication(s) are not appropriate for processing by [facility] for the following reason(s):        Outside of protocol  Required labs outdated    [facility] action(s):  Defer  Route     Requires labs : Yes             Appointments  past 12m or future 3m with PCP    Date Provider   Last Visit   6/19/2023 Isidro Prado MD   Next Visit   6/16/2024 Isidro Prado MD   ED visits in past 90 days: 0        Note composed:2:57 AM 06/17/2024

## 2024-06-17 NOTE — TELEPHONE ENCOUNTER
Refill Decision Note   Vijay Rodriguez  is requesting a refill authorization.  Brief Assessment and Rationale for Refill:  Quick Discontinue     Medication Therapy Plan:  Duplicate      Comments:     Note composed:2:57 AM 06/17/2024

## 2024-06-19 ENCOUNTER — PATIENT MESSAGE (OUTPATIENT)
Dept: ADMINISTRATIVE | Facility: HOSPITAL | Age: 64
End: 2024-06-19
Payer: COMMERCIAL

## 2024-06-19 DIAGNOSIS — E11.69 HYPERLIPIDEMIA ASSOCIATED WITH TYPE 2 DIABETES MELLITUS: ICD-10-CM

## 2024-06-19 DIAGNOSIS — E11.9 TYPE 2 DIABETES MELLITUS WITHOUT COMPLICATION, UNSPECIFIED WHETHER LONG TERM INSULIN USE: ICD-10-CM

## 2024-06-19 DIAGNOSIS — E78.5 HYPERLIPIDEMIA ASSOCIATED WITH TYPE 2 DIABETES MELLITUS: ICD-10-CM

## 2024-06-19 DIAGNOSIS — E11.9 TYPE 2 DIABETES MELLITUS WITHOUT COMPLICATION: ICD-10-CM

## 2024-06-20 RX ORDER — GLIPIZIDE 10 MG/1
10 TABLET ORAL 2 TIMES DAILY WITH MEALS
Qty: 180 TABLET | Refills: 0 | OUTPATIENT
Start: 2024-06-20

## 2024-06-20 RX ORDER — LEVOTHYROXINE SODIUM 50 UG/1
50 TABLET ORAL
Qty: 90 TABLET | Refills: 0 | OUTPATIENT
Start: 2024-06-20

## 2024-06-20 RX ORDER — ERGOCALCIFEROL 1.25 MG/1
50000 CAPSULE ORAL
Qty: 12 CAPSULE | Refills: 0 | OUTPATIENT
Start: 2024-06-20

## 2024-06-24 ENCOUNTER — PATIENT MESSAGE (OUTPATIENT)
Dept: ADMINISTRATIVE | Facility: HOSPITAL | Age: 64
End: 2024-06-24
Payer: COMMERCIAL

## 2024-08-14 ENCOUNTER — TELEPHONE (OUTPATIENT)
Dept: FAMILY MEDICINE | Facility: CLINIC | Age: 64
End: 2024-08-14

## 2024-08-14 ENCOUNTER — HOSPITAL ENCOUNTER (EMERGENCY)
Facility: HOSPITAL | Age: 64
Discharge: HOME | End: 2024-08-14
Attending: EMERGENCY MEDICINE
Payer: COMMERCIAL

## 2024-08-14 ENCOUNTER — OFFICE VISIT (OUTPATIENT)
Dept: FAMILY MEDICINE | Facility: CLINIC | Age: 64
End: 2024-08-14
Payer: COMMERCIAL

## 2024-08-14 VITALS
HEART RATE: 72 BPM | SYSTOLIC BLOOD PRESSURE: 118 MMHG | DIASTOLIC BLOOD PRESSURE: 70 MMHG | OXYGEN SATURATION: 95 % | WEIGHT: 194.88 LBS | BODY MASS INDEX: 32.47 KG/M2 | TEMPERATURE: 98 F | HEIGHT: 65 IN

## 2024-08-14 VITALS
TEMPERATURE: 99 F | DIASTOLIC BLOOD PRESSURE: 83 MMHG | RESPIRATION RATE: 18 BRPM | SYSTOLIC BLOOD PRESSURE: 138 MMHG | BODY MASS INDEX: 31.65 KG/M2 | WEIGHT: 190 LBS | HEIGHT: 65 IN | HEART RATE: 61 BPM | OXYGEN SATURATION: 95 %

## 2024-08-14 DIAGNOSIS — Z12.11 COLON CANCER SCREENING: ICD-10-CM

## 2024-08-14 DIAGNOSIS — K60.2 ANAL FISSURE: Primary | ICD-10-CM

## 2024-08-14 DIAGNOSIS — E11.9 CONTROLLED TYPE 2 DIABETES MELLITUS WITHOUT COMPLICATION, WITHOUT LONG-TERM CURRENT USE OF INSULIN: ICD-10-CM

## 2024-08-14 DIAGNOSIS — K61.0 PERIANAL ABSCESS: Primary | ICD-10-CM

## 2024-08-14 PROCEDURE — 46050 I&D PERIANAL ABSCESS SUPFC: CPT

## 2024-08-14 PROCEDURE — 99999 PR PBB SHADOW E&M-EST. PATIENT-LVL V: CPT | Mod: PBBFAC,,,

## 2024-08-14 PROCEDURE — 99283 EMERGENCY DEPT VISIT LOW MDM: CPT | Mod: 25

## 2024-08-14 PROCEDURE — 25000003 PHARM REV CODE 250: Performed by: PHYSICIAN ASSISTANT

## 2024-08-14 PROCEDURE — 25000003 PHARM REV CODE 250

## 2024-08-14 RX ORDER — LIDOCAINE HYDROCHLORIDE 10 MG/ML
INJECTION, SOLUTION INFILTRATION; PERINEURAL
Status: COMPLETED
Start: 2024-08-14 | End: 2024-08-14

## 2024-08-14 RX ORDER — LIDOCAINE HYDROCHLORIDE 20 MG/ML
JELLY TOPICAL
Qty: 30 ML | Refills: 1 | Status: SHIPPED | OUTPATIENT
Start: 2024-08-14

## 2024-08-14 RX ORDER — AMOXICILLIN AND CLAVULANATE POTASSIUM 875; 125 MG/1; MG/1
1 TABLET, FILM COATED ORAL
Status: COMPLETED | OUTPATIENT
Start: 2024-08-14 | End: 2024-08-14

## 2024-08-14 RX ORDER — LIDOCAINE HYDROCHLORIDE AND HYDROCORTISONE ACETATE 30; 5 MG/G; MG/G
1 CREAM RECTAL 2 TIMES DAILY
Qty: 7 G | Refills: 0 | Status: SHIPPED | OUTPATIENT
Start: 2024-08-14

## 2024-08-14 RX ORDER — AMOXICILLIN AND CLAVULANATE POTASSIUM 875; 125 MG/1; MG/1
1 TABLET, FILM COATED ORAL EVERY 12 HOURS
Qty: 10 TABLET | Refills: 0 | Status: SHIPPED | OUTPATIENT
Start: 2024-08-14 | End: 2024-08-19

## 2024-08-14 RX ORDER — NITROGLYCERIN 4 MG/G
1 OINTMENT RECTAL 2 TIMES DAILY
Qty: 30 G | Refills: 1 | Status: SHIPPED | OUTPATIENT
Start: 2024-08-14 | End: 2025-08-14

## 2024-08-14 RX ORDER — LIDOCAINE HYDROCHLORIDE 10 MG/ML
20 INJECTION, SOLUTION INFILTRATION; PERINEURAL ONCE
Status: COMPLETED | OUTPATIENT
Start: 2024-08-14 | End: 2024-08-14

## 2024-08-14 RX ORDER — LIDOCAINE HYDROCHLORIDE 10 MG/ML
10 INJECTION, SOLUTION INFILTRATION; PERINEURAL ONCE
Status: DISCONTINUED | OUTPATIENT
Start: 2024-08-14 | End: 2024-08-14

## 2024-08-14 RX ADMIN — LIDOCAINE HYDROCHLORIDE 20 ML: 10 INJECTION, SOLUTION INFILTRATION; PERINEURAL at 05:08

## 2024-08-14 RX ADMIN — AMOXICILLIN AND CLAVULANATE POTASSIUM 1 TABLET: 875; 125 TABLET, FILM COATED ORAL at 05:08

## 2024-08-14 NOTE — Clinical Note
"Vijay Gibbons (Raymond)heladio was seen and treated in our emergency department on 8/14/2024.  He may return to work on 08/16/2024.       If you have any questions or concerns, please don't hesitate to call.      Olive Church PA-C"

## 2024-08-14 NOTE — ED TRIAGE NOTES
Pt reports 'anal boil' x 2-3 days; pt denies blood in stool and states that he has had similar incident about a year ago; currently 10/10 pain

## 2024-08-14 NOTE — PROGRESS NOTES
Patient given pneumo vaccine via injection. 0 complaints of, tolerated well. Patient advised to wait in lobby 15mins for adverse reaction. Verbalized understanding.

## 2024-08-14 NOTE — DISCHARGE INSTRUCTIONS
Your perianal abscess was drained in the emergency department.  You were given your 1st dose of Augmentin for prophylaxis coverage.  Continue take every 12 hours for the next 5 days.    Take ibuprofen 600-800 mg every 6 hour as needed with food for anti-inflammatory relief.  You can take acetaminophen/tylenol 650 mg every 6 hours or 1000 mg every 8 hours for added relief.  You can take warm Sitz bath.  Continue to soft in your stools to help with rectal pain.  Make sure you keep the area clean by wiping well after a bowel movement and having proper hygiene to avoid recurrence of abscess.  Follow up with colon and rectal surgery. Referral placed.  Return to the ER for new or worsening symptoms.

## 2024-08-14 NOTE — PROGRESS NOTES
HPI     Chief Complaint:  Chief Complaint   Patient presents with    Recurrent Skin Infections       Vijay Rodriguez Jr. is a 64 y.o. male with multiple medical diagnoses as listed in the medical history and problem list that presents for   Chief Complaint   Patient presents with    Recurrent Skin Infections    .     Patient is not known to me with his last appointment in this department on Visit date not found.     Pt presents for anal lesion.  Abscess  Chronicity:  NewProgression Since Onset: rapidly worsening  Location:  Ano-genital  Associated Symptoms: no fever, no chills  Characteristics: painful    Characteristics: not draining    Pain Scale:  9/10  Ineffective treatments: vinegar, hemorrohoid cream, epsom salt.          Assessment & Plan     Problem List Items Addressed This Visit          Endocrine    -Controlled type 2 diabetes mellitus without complication, without long-term current use of insulin  Stable. Will order A1c, DM urine screen and foot exam.  Hemoglobin A1C   Date Value Ref Range Status   06/15/2023 7.3 (H) <5.7 % of total Hgb Final     Comment:     For someone without known diabetes, a hemoglobin A1c  value of 6.5% or greater indicates that they may have   diabetes and this should be confirmed with a follow-up   test.     For someone with known diabetes, a value <7% indicates   that their diabetes is well controlled and a value   greater than or equal to 7% indicates suboptimal   control. A1c targets should be individualized based on   duration of diabetes, age, comorbid conditions, and   other considerations.     Currently, no consensus exists regarding use of  hemoglobin A1c for diagnosis of diabetes for children.         01/09/2023 8.4 (H) <5.7 % of total Hgb Final     Comment:     For someone without known diabetes, a hemoglobin A1c  value of 6.5% or greater indicates that they may have   diabetes and this should be confirmed with a follow-up   test.     For someone with known  diabetes, a value <7% indicates   that their diabetes is well controlled and a value   greater than or equal to 7% indicates suboptimal   control. A1c targets should be individualized based on   duration of diabetes, age, comorbid conditions, and   other considerations.     Currently, no consensus exists regarding use of  hemoglobin A1c for diagnosis of diabetes for children.         03/21/2022 9.6 (H) <5.7 % of total Hgb Final     Comment:     For someone without known diabetes, a hemoglobin A1c  value of 6.5% or greater indicates that they may have   diabetes and this should be confirmed with a follow-up   test.     For someone with known diabetes, a value <7% indicates   that their diabetes is well controlled and a value   greater than or equal to 7% indicates suboptimal   control. A1c targets should be individualized based on   duration of diabetes, age, comorbid conditions, and   other considerations.     Currently, no consensus exists regarding use of  hemoglobin A1c for diagnosis of diabetes for children.               Relevant Orders    HEMOGLOBIN A1C    Microalbumin/Creatinine Ratio, Urine    Ambulatory referral/consult to Podiatry     Other Visit Diagnoses       Anal fissure    -  Primary  Tender anal lesion that started 2 days ago. Will order lidocaine cream and nitroglycerin ointment.    Relevant Medications    LIDOcaine HCL 2% (XYLOCAINE) 2 % jelly    nitroglycerin (RECTIV) 0.4 % (w/w) Oint    LIDOcaine HCl-hydrocortison ac 3-0.5 % Crea    Colon cancer screening      Due for colon cancer screening, will order colonoscopy.    Relevant Orders    Ambulatory referral/consult to Endo Procedure               --------------------------------------------      Health Maintenance:  Health Maintenance         Date Due Completion Date    Low Dose Statin Never done ---    RSV Vaccine (Age 60+ and Pregnant patients) (1 - 1-dose 60+ series) Never done ---    COVID-19 Vaccine (5 - 2023-24 season) 09/01/2023  "11/10/2022    Hemoglobin A1c 12/15/2023 6/15/2023    Diabetes Urine Screening 01/09/2024 1/9/2023    Lipid Panel 01/09/2024 1/9/2023    Colorectal Cancer Screening 03/24/2024 3/24/2021    Foot Exam 06/19/2024 6/19/2023    Eye Exam 06/19/2024 6/19/2023    Influenza Vaccine (1) 09/01/2024 11/10/2022            Health maintenance reviewed    Follow Up:  No follow-ups on file.    Exam     Review of Systems:  (as noted above)  Review of Systems   Constitutional:  Negative for chills and fever.       Physical Exam:   Physical Exam  Exam conducted with a chaperone present.   Constitutional:       Appearance: Normal appearance.   Cardiovascular:      Rate and Rhythm: Normal rate.   Pulmonary:      Effort: Pulmonary effort is normal.   Genitourinary:     Rectum: Tenderness and anal fissure present. No external hemorrhoid.   Neurological:      Mental Status: He is alert.       Vitals:    08/14/24 0902   BP: 118/70   BP Location: Right arm   Patient Position: Sitting   BP Method: Large (Manual)   Pulse: 72   Temp: 98.1 °F (36.7 °C)   TempSrc: Oral   SpO2: 95%   Weight: 88.4 kg (194 lb 14.2 oz)   Height: 5' 5" (1.651 m)      Body mass index is 32.43 kg/m².        History     Past Medical History:  Past Medical History:   Diagnosis Date    Colon polyp     Diabetes mellitus type II     Hypertension        Past Surgical History:  Past Surgical History:   Procedure Laterality Date    PROSTATE SURGERY  2007       Social History:  Social History     Socioeconomic History    Marital status:    Occupational History     Employer: [employer]   Tobacco Use    Smoking status: Every Day     Current packs/day: 0.25     Average packs/day: 0.3 packs/day for 30.0 years (7.5 ttl pk-yrs)     Types: Cigarettes   Substance and Sexual Activity    Alcohol use: Yes     Comment: 6 pk beer per week    Drug use: No       Family History:  Family History   Problem Relation Name Age of Onset    Cancer Mother  60        colon    Hypertension Mother      " Cancer Father  60        prostate    Diabetes Father      Hypertension Father      Hypertension Brother      Cancer Brother  52        pancreatic       Allergies and Medications: (updated and reviewed)  Review of patient's allergies indicates:   Allergen Reactions    Hymenoptera allergenic extract Anaphylaxis    Insects extract     Lisinopril Other (See Comments)     cough    Shellfish containing products Hives     Current Outpatient Medications   Medication Sig Dispense Refill    acetaminophen-codeine 300-30mg (TYLENOL #3) 300-30 mg Tab Take 1-2 tablets by mouth every 6 (six) hours as needed.      albuterol (PROVENTIL/VENTOLIN HFA) 90 mcg/actuation inhaler Inhale 2 puffs into the lungs.      amLODIPine (NORVASC) 10 MG tablet Take 1 tablet (10 mg total) by mouth once daily. Followup Dr.T Prado DECEMBER 2023 for more refills, call to schedule/get labs 1wk before doctor's appointment (ordered). 90 tablet 1    atorvastatin (LIPITOR) 20 MG tablet Take 1 tablet (20 mg total) by mouth once daily. 90 tablet 3    azelastine (ASTELIN) 137 mcg (0.1 %) nasal spray 1 spray (137 mcg total) by Nasal route 2 (two) times daily. 30 mL 11    EPINEPHrine (EPIPEN) 0.3 mg/0.3 mL AtIn TAKE 1 AUTO(S) AS NEEDED BY INJECTION ROUTE. Strength: 0.3 mg/0.3 mL 2 each 1    fluticasone propionate (FLONASE) 50 mcg/actuation nasal spray 1 spray (50 mcg total) by Each Nostril route 2 (two) times daily. 48 mL 11    glipiZIDE (GLUCOTROL) 10 MG tablet Take 1 tablet (10 mg total) by mouth 2 (two) times daily with meals. Followup Dr.T Prado DECEMBER 2023 for more refills, call to schedule/get labs 1wk before doctor's appointment (ordered). 180 tablet 1    levothyroxine (SYNTHROID) 50 MCG tablet Take 1 tablet (50 mcg total) by mouth before breakfast. Followup Dr.T Prado DECEMBER 2023 for more refills, call to schedule/get labs 1wk before doctor's appointment (ordered). 90 tablet 1    metFORMIN (GLUCOPHAGE-XR) 500 MG ER 24hr tablet Take 2 tablets (1,000  mg total) by mouth 2 (two) times daily with meals. Followup Dr.T Prado DECEMBER 2023 for more refills, call to schedule/get labs 1wk before doctor's appointment (ordered). 360 tablet 1    nicotine (NICODERM CQ) 14 mg/24 hr Place 1 patch onto the skin once daily. 21 patch 15    ranitidine (ZANTAC) 300 MG tablet Take 1 tablet by mouth.      tadalafiL (CIALIS) 20 MG Tab Take 1 tablet (20 mg total) by mouth once daily. 30 tablet 5    VITAMIN D2 1,250 mcg (50,000 unit) capsule Take 1 capsule (50,000 Units total) by mouth every 7 days. 12 capsule 3    amoxicillin-clavulanate 875-125mg (AUGMENTIN) 875-125 mg per tablet Take 1 tablet by mouth every 12 (twelve) hours. for 5 days 10 tablet 0    LIDOcaine HCL 2% (XYLOCAINE) 2 % jelly Apply topically as needed (anal fissure). 30 mL 1    LIDOcaine HCl-hydrocortison ac 3-0.5 % Crea Place 1 Application rectally 2 (two) times a day. 7 g 0    nitroglycerin (RECTIV) 0.4 % (w/w) Oint Place 1 Application rectally 2 (two) times a day. 30 g 1     No current facility-administered medications for this visit.       Patient Care Team:  Isidro Prado MD as PCP - General (Family Medicine)  Sasha Coker MA as Care Coordinator  Jw Roberts MD as Consulting Physician (Ophthalmology)         - The patient is given an After Visit Summary that lists all medications with directions, allergies, education, orders placed during this encounter and follow-up instructions.      - I have reviewed the patient's medical information including past medical, family, and social history sections including the medications and allergies.      - We discussed the patient's current medications.     This note was created by combination of typed  and MModal dictation.  Transcription errors may be present.  If there are any questions, please contact me.       Jane Kline NP

## 2024-08-14 NOTE — PATIENT INSTRUCTIONS
Medical [facility]itness--[phone]  Imaging, Xray, CT, MRI, Ultrasound---[phone]  Bariatrics---[phone]  Breast Surgery---[phone]  Case Management---[phone]  Colonoscopy---[phone]  DME---[phone]  Infectious Disease---[phone]  Interventional Radiology---[phone]  Medical Records---[phone]  [facility]---[phone]  Optometry/Ophthalmology---[phone]  [facility]---[phone]  Physical Therapy---[phone]  Psychiatry---[phone] or [phone]  Plastic Surgery---[phone]  Recovery--[phone] option 2, or [phone].  Sleep Study---[phone]  Smoking Cessation---[phone]  Wound Care---[phone]  Referral Desk---[phone]

## 2024-08-14 NOTE — ED PROVIDER NOTES
Encounter Date: 8/14/2024       History     Chief Complaint   Patient presents with    Abscess     Rectum, not draining, fever, diabetic     4:45 PM  Patient is a 64-year-old male who presents to [facility] ED for emergent evaluation rectal pain and possible abscess.  He noted pain about 2-3 days ago.  Endorses 10/10 pain.  Last had I&D in January 20, 2023.  He had a temp of 100° triage, but denies any subjective fever, chills, diaphoresis.  His bowel movements are soft without melena or blood secondary to laxative use. Denies purulent drainage. He denies history of IBD.  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        Review of patient's allergies indicates:   Allergen Reactions    Hymenoptera allergenic extract Anaphylaxis    Insects extract     Lisinopril Other (See Comments)     cough    Shellfish containing products Hives     Past Medical History:   Diagnosis Date    Colon polyp     Diabetes mellitus type II     Hypertension      Past Surgical History:   Procedure Laterality Date    PROSTATE SURGERY  2007     Family History   Problem Relation Name Age of Onset    Cancer Mother  60        colon    Hypertension Mother      Cancer Father  60        prostate    Diabetes Father      Hypertension Father      Hypertension Brother      Cancer Brother  52        pancreatic     Social History     Tobacco Use    Smoking status: Every Day     Current packs/day: 0.25     Average packs/day: 0.3 packs/day for 30.0 years (7.5 ttl pk-yrs)     Types: Cigarettes   Substance Use Topics    Alcohol use: Yes     Comment: 6 pk beer per week    Drug use: No     Review of Systems   Constitutional:  Negative for fever.   HENT:  Negative for sore throat.    Respiratory:  Negative for shortness of breath.    Cardiovascular:  Negative for chest pain.   Gastrointestinal:  Positive for rectal pain. Negative for abdominal pain, blood in stool, constipation and nausea.   Genitourinary:  Negative for  dysuria.   Musculoskeletal:  Negative for back pain.   Skin:  Negative for rash.   Neurological:  Negative for weakness.   Hematological:  Does not bruise/bleed easily.       Physical Exam     Initial Vitals [08/14/24 1556]   BP Pulse Resp Temp SpO2   138/79 69 18 100 °F (37.8 °C) 98 %      MAP       --         Physical Exam    Vitals reviewed.  Constitutional: He appears well-developed and well-nourished. He is not diaphoretic. He is cooperative.  Non-toxic appearance. He does not have a sickly appearance. He does not appear ill. No distress.   HENT:   Head: Normocephalic and atraumatic.   Nose: Nose normal.   Mouth/Throat: No trismus in the jaw.   Eyes: Conjunctivae and EOM are normal.   Neck:   Normal range of motion.  Pulmonary/Chest: No accessory muscle usage. No tachypnea. No respiratory distress.   Abdominal: He exhibits no distension.   Genitourinary:    Genitourinary Comments: Chaperone present for rectal exam.  He has a fluctuant 2 x 1 cm abscess external to his anus.  No obvious drainage, surrounding erythema, induration.     Musculoskeletal:         General: Normal range of motion.      Cervical back: Normal range of motion.     Neurological: He is alert. He has normal strength.   Skin: Skin is dry. No pallor.         ED Course   I & D - Incision and Drainage    Date/Time: 8/14/2024 5:47 PM  Location procedure was performed: [facility] EMERGENCY DEPARTMENT    Performed by: Olive Church PA-C  Authorized by: Kell Gagnon MD  Type: abscess  Body area: anogenital  Location details: perianal  Anesthesia: local infiltration    Anesthesia:  Local Anesthetic: lidocaine 1% without epinephrine  Anesthetic total: 3 mL  Scalpel size: 11  Incision type: single straight  Complexity: simple  Drainage: pus  Drainage amount: copious  Wound treatment: incision, wound left open, deloculation and expression of material  Complications: No  Estimated blood loss (mL): 0  Patient tolerance: Patient tolerated the procedure well  with no immediate complications  Comments: After injection of lidocaine, there was pus drainage from injection points. After anesthesia achieved, I incise a linear 1 cm cut along the abscess. It was deloculated. I expressed as much as possible. He has no underlying erythema or induration.         Labs Reviewed - No data to display         Imaging Results    None          Medications   amoxicillin-clavulanate 875-125mg per tablet 1 tablet (1 tablet Oral Given 8/14/24 1744)   LIDOcaine HCL 10 mg/ml (1%) injection 20 mL (20 mLs Other Given 8/14/24 1747)     Medical Decision Making  Risk  Prescription drug management.               ED Course as of 08/14/24 2206   Wed Aug 14, 2024   1623 BP: 138/79 [CL]   1623 Temp: 100 °F (37.8 °C) [CL]   1623 Pulse: 69 [CL]   1623 Resp: 18 [CL]   1623 SpO2: 98 % [CL]   1643 Temp: 98.7 °F (37.1 °C)  I check temp myself. Afebrile. He did not have any antipyretics today. Denies systemic symptoms at home. Don't think he really has a low grade temp. [CL]   1745 Abscess drain.  See procedure note. Will place on Augmentin. Warm sitz bath. Continue laxative use. Follow up with [facility]. Referral placed. Seems like he has C scope scheduled for later this year. Follow up. Return to ER precautions given.  [CL]      ED Course User Index  [CL] Olive Church PA-C                           Clinical Impression:  Final diagnoses:  [K61.0] Perianal abscess (Primary)          ED Disposition Condition    Discharge Stable          ED Prescriptions       Medication Sig Dispense Start Date End Date Auth. Provider    amoxicillin-clavulanate 875-125mg (AUGMENTIN) 875-125 mg per tablet Take 1 tablet by mouth every 12 (twelve) hours. for 5 days 10 tablet 8/14/2024 8/19/2024 Olive Church PA-C          Follow-up Information       Follow up With Specialties Details Why Contact Info Additional Information    [facility] [address] Colon and Rectal Surgery Schedule an appointment as soon as possible for a  visit   [address]  [facility] [address] [phone] [facility] - [address]rium 4th Floor Please park in [address] and use Atrium elevator    [facility] Emergency Dept Emergency Medicine  If symptoms worsen [address]  [facility] [address] [phone]           Future Appointments   Date Time Provider Department Center   10/30/2024 11:40 AM PRE-ADMIT NURSE 3, [facility]  08/14/24 8544

## 2024-08-17 ENCOUNTER — TELEPHONE (OUTPATIENT)
Dept: PHARMACY | Facility: CLINIC | Age: 64
End: 2024-08-17
Payer: COMMERCIAL

## 2024-08-17 NOTE — TELEPHONE ENCOUNTER
[facility]Population [facility] Chart Review & Patient Outreach Details For Medication Adherence Project    Reason for Outreach Encounter: 3rd Party payor non-compliance report ([employer], [employer], C, etc)  2.  Patient Outreach Method: SharePlowhart message  3.   Medication in question: atorvastatin 20 mg    LAST FILLED: 1/5/24 for 90 day supply  Hyperlipidemia Medications               atorvastatin (LIPITOR) 20 MG tablet Take 1 tablet (20 mg total) by mouth once daily.               4.  Reviewed and or Updates Made To: Patient Chart  5. Outreach Outcomes and/or actions taken: Sent inquiry to patient: Waiting for response.

## 2024-09-11 DIAGNOSIS — E78.5 HYPERLIPIDEMIA ASSOCIATED WITH TYPE 2 DIABETES MELLITUS: ICD-10-CM

## 2024-09-11 DIAGNOSIS — E11.69 HYPERLIPIDEMIA ASSOCIATED WITH TYPE 2 DIABETES MELLITUS: ICD-10-CM

## 2024-09-11 RX ORDER — ATORVASTATIN CALCIUM 20 MG/1
20 TABLET, FILM COATED ORAL DAILY
Qty: 90 TABLET | Refills: 3 | Status: SHIPPED | OUTPATIENT
Start: 2024-09-11 | End: 2025-09-11

## 2024-09-12 ENCOUNTER — PATIENT OUTREACH (OUTPATIENT)
Dept: ADMINISTRATIVE | Facility: HOSPITAL | Age: 64
End: 2024-09-12
Payer: COMMERCIAL

## 2024-10-08 ENCOUNTER — TELEPHONE (OUTPATIENT)
Dept: FAMILY MEDICINE | Facility: CLINIC | Age: 64
End: 2024-10-08
Payer: COMMERCIAL

## 2024-10-08 NOTE — TELEPHONE ENCOUNTER
Attempted to contact patient. Left a voice mail to call clinic back to schedule office visit and labs for medication refill.

## 2024-11-25 ENCOUNTER — TELEPHONE (OUTPATIENT)
Dept: PHARMACY | Facility: CLINIC | Age: 64
End: 2024-11-25
Payer: COMMERCIAL

## 2024-11-25 NOTE — TELEPHONE ENCOUNTER
[facility]/Population Health Chart Review & Patient Outreach Details For Medication Adherence Project    Reason for Outreach Encounter: 3rd Party payor non-compliance report ([employer], [employer], C, etc)  2.  Patient Outreach Method: Reviewed patient chart   3.   Medication in question:   Hyperlipidemia Medications               atorvastatin (LIPITOR) 20 MG tablet Take 1 tablet (20 mg total) by mouth once daily.                  atorvastatin  last filled  9/11/24 for 90 day supply    4.  Reviewed and or Updates Made To: Patient Chart  5. Outreach Outcomes and/or actions taken: Patient filled medication and is on track to be adherent  Additional Notes:

## 2025-02-25 ENCOUNTER — PATIENT OUTREACH (OUTPATIENT)
Dept: ADMINISTRATIVE | Facility: HOSPITAL | Age: 65
End: 2025-02-25
Payer: COMMERCIAL

## 2025-02-25 NOTE — PROGRESS NOTES
Overdue PCP visit, Diabetes Labs w/ Urine, Diabetic Eye & Foot Exam, Colorectal Screening, SDOH, Diabetes Education, Medication Adherence, Digital Medicine ( not eligible ) - spoke w/ patient, to start his assessment. He was made aware of his overdue Health Maintenace, He is unable sumaya schedule at this time. He will call back once his work schedule has been reviewed.

## 2025-06-11 ENCOUNTER — PATIENT OUTREACH (OUTPATIENT)
Dept: ADMINISTRATIVE | Facility: HOSPITAL | Age: 65
End: 2025-06-11
Payer: COMMERCIAL

## 2025-06-17 ENCOUNTER — PATIENT OUTREACH (OUTPATIENT)
Dept: ADMINISTRATIVE | Facility: HOSPITAL | Age: 65
End: 2025-06-17
Payer: COMMERCIAL